# Patient Record
Sex: MALE | Race: WHITE | Employment: OTHER | URBAN - METROPOLITAN AREA
[De-identification: names, ages, dates, MRNs, and addresses within clinical notes are randomized per-mention and may not be internally consistent; named-entity substitution may affect disease eponyms.]

---

## 2017-11-02 PROBLEM — I72.4 FEMORAL ARTERY PSEUDO-ANEURYSM, LEFT (HCC): Status: ACTIVE | Noted: 2017-11-02

## 2018-01-01 ENCOUNTER — OFFICE VISIT (OUTPATIENT)
Dept: CARDIOLOGY CLINIC | Age: 78
End: 2018-01-01
Payer: MEDICARE

## 2018-01-01 ENCOUNTER — HOSPITAL ENCOUNTER (OUTPATIENT)
Dept: ULTRASOUND IMAGING | Age: 78
Discharge: HOME | End: 2018-12-07
Payer: MEDICARE

## 2018-01-01 ENCOUNTER — TELEPHONE (OUTPATIENT)
Dept: VASCULAR SURGERY | Age: 78
End: 2018-01-01

## 2018-01-01 ENCOUNTER — HOSPITAL ENCOUNTER (OUTPATIENT)
Dept: INTERVENTIONAL RADIOLOGY/VASCULAR | Age: 78
Discharge: HOME | End: 2018-12-07
Payer: MEDICARE

## 2018-01-01 ENCOUNTER — OFFICE VISIT (OUTPATIENT)
Dept: VASCULAR SURGERY | Age: 78
End: 2018-01-01
Payer: MEDICARE

## 2018-01-01 ENCOUNTER — HOSPITAL ENCOUNTER (OUTPATIENT)
Age: 78
End: 2018-01-01
Payer: MEDICARE

## 2018-01-01 VITALS
HEIGHT: 67 IN | DIASTOLIC BLOOD PRESSURE: 52 MMHG | RESPIRATION RATE: 18 BRPM | HEART RATE: 55 BPM | WEIGHT: 195 LBS | SYSTOLIC BLOOD PRESSURE: 98 MMHG | BODY MASS INDEX: 30.61 KG/M2

## 2018-01-01 DIAGNOSIS — I73.9 PVD (PERIPHERAL VASCULAR DISEASE) WITH CLAUDICATION (HCC): ICD-10-CM

## 2018-01-01 DIAGNOSIS — F17.200 TOBACCO DEPENDENCE: ICD-10-CM

## 2018-01-01 DIAGNOSIS — I10 ESSENTIAL HYPERTENSION: ICD-10-CM

## 2018-01-01 DIAGNOSIS — Z98.890 S/P CAROTID ENDARTERECTOMY: ICD-10-CM

## 2018-01-01 DIAGNOSIS — I89.0 LYMPHEDEMA OF BOTH LOWER EXTREMITIES: ICD-10-CM

## 2018-01-01 DIAGNOSIS — R09.89 BRUIT OF RIGHT CAROTID ARTERY: ICD-10-CM

## 2018-01-01 DIAGNOSIS — I25.10 ATHEROSCLEROSIS OF NATIVE CORONARY ARTERY OF NATIVE HEART WITHOUT ANGINA PECTORIS: Primary | ICD-10-CM

## 2018-01-01 DIAGNOSIS — I72.4 FEMORAL ARTERY ANEURYSM, LEFT (HCC): ICD-10-CM

## 2018-01-01 DIAGNOSIS — I72.4 FEMORAL ARTERY ANEURYSM, LEFT (HCC): Primary | ICD-10-CM

## 2018-01-01 DIAGNOSIS — I72.4 FEMORAL ARTERY PSEUDO-ANEURYSM, LEFT (HCC): ICD-10-CM

## 2018-01-01 DIAGNOSIS — Z98.62 HISTORY OF ANGIOPLASTY OF PERIPHERAL VESSEL: ICD-10-CM

## 2018-01-01 DIAGNOSIS — Z95.5 PRESENCE OF DRUG COATED STENT IN LEFT CIRCUMFLEX CORONARY ARTERY: ICD-10-CM

## 2018-01-01 PROCEDURE — 1123F ACP DISCUSS/DSCN MKR DOCD: CPT | Performed by: INTERNAL MEDICINE

## 2018-01-01 PROCEDURE — 4004F PT TOBACCO SCREEN RCVD TLK: CPT | Performed by: INTERNAL MEDICINE

## 2018-01-01 PROCEDURE — G8427 DOCREV CUR MEDS BY ELIG CLIN: HCPCS | Performed by: SURGERY

## 2018-01-01 PROCEDURE — 93880 EXTRACRANIAL BILAT STUDY: CPT

## 2018-01-01 PROCEDURE — 4040F PNEUMOC VAC/ADMIN/RCVD: CPT | Performed by: INTERNAL MEDICINE

## 2018-01-01 PROCEDURE — 1101F PT FALLS ASSESS-DOCD LE1/YR: CPT | Performed by: INTERNAL MEDICINE

## 2018-01-01 PROCEDURE — G8598 ASA/ANTIPLAT THER USED: HCPCS | Performed by: INTERNAL MEDICINE

## 2018-01-01 PROCEDURE — 4004F PT TOBACCO SCREEN RCVD TLK: CPT | Performed by: SURGERY

## 2018-01-01 PROCEDURE — 93923 UPR/LXTR ART STDY 3+ LVLS: CPT

## 2018-01-01 PROCEDURE — G8427 DOCREV CUR MEDS BY ELIG CLIN: HCPCS | Performed by: INTERNAL MEDICINE

## 2018-01-01 PROCEDURE — G8417 CALC BMI ABV UP PARAM F/U: HCPCS | Performed by: INTERNAL MEDICINE

## 2018-01-01 PROCEDURE — 1123F ACP DISCUSS/DSCN MKR DOCD: CPT | Performed by: SURGERY

## 2018-01-01 PROCEDURE — G8484 FLU IMMUNIZE NO ADMIN: HCPCS | Performed by: SURGERY

## 2018-01-01 PROCEDURE — 93000 ELECTROCARDIOGRAM COMPLETE: CPT | Performed by: INTERNAL MEDICINE

## 2018-01-01 PROCEDURE — G8421 BMI NOT CALCULATED: HCPCS | Performed by: SURGERY

## 2018-01-01 PROCEDURE — G8598 ASA/ANTIPLAT THER USED: HCPCS | Performed by: SURGERY

## 2018-01-01 PROCEDURE — 4040F PNEUMOC VAC/ADMIN/RCVD: CPT | Performed by: SURGERY

## 2018-01-01 PROCEDURE — G8484 FLU IMMUNIZE NO ADMIN: HCPCS | Performed by: INTERNAL MEDICINE

## 2018-01-01 PROCEDURE — 1101F PT FALLS ASSESS-DOCD LE1/YR: CPT | Performed by: SURGERY

## 2018-01-01 PROCEDURE — 99214 OFFICE O/P EST MOD 30 MIN: CPT | Performed by: SURGERY

## 2018-01-01 PROCEDURE — 93926 LOWER EXTREMITY STUDY: CPT

## 2018-01-01 PROCEDURE — 99213 OFFICE O/P EST LOW 20 MIN: CPT | Performed by: INTERNAL MEDICINE

## 2018-11-15 NOTE — PROGRESS NOTES
Chief Complaint:   Chief Complaint   Patient presents with    Circulatory Problem     Follow up circulation in legs.          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 left femoral-popliteal bypass overall doing well, still has some swelling of both due to lymphedema, complains of some cardiac issues following up with his medical doctors      Patient denies any focal lateralizing neurological symptoms like loss of speech, vision or loss of function of extremity    Patient can walk 1 block at a time, and denies any symptoms of rest pain    No Known Allergies    Current Outpatient Prescriptions   Medication Sig Dispense Refill    cilostazol (PLETAL) 100 MG tablet TAKE 1 TABLET BY MOUTH TWICE DAILY (Patient taking differently: Taking 50 mg twice daily) 60 tablet 11    baclofen (LIORESAL) 10 MG tablet 2 times daily as needed   0    mupirocin (BACTROBAN) 2 % cream Apply topically daily Buttock      Neomycin-Polymyxin-HC (CORTISPORIN OT) Place 2 drops in ear(s) 2 times daily 1 % both ears      cholestyramine (QUESTRAN) 4 GM/DOSE powder 4 g nightly 1 scoop  0    escitalopram (LEXAPRO) 20 MG tablet daily Take morning of surgery with a sip of water  1    HYDROcodone-acetaminophen (NORCO) 7.5-325 MG per tablet take 1 tablet by mouth every 6 hours if needed  0    butenafine (LOTRIMIN ULTRA) 1 % CREA Apply to both groins BID x 1 month 1 Tube 10    clopidogrel (PLAVIX) 75 MG tablet take 1 tablet by mouth once daily 30 tablet 5    loperamide (IMODIUM) 2 MG capsule Take 2 mg by mouth 4 times daily as needed for Diarrhea      atorvastatin (LIPITOR) 80 MG tablet Take 1 tablet by mouth nightly (Patient taking differently: Take 80 mg by mouth daily ) 30 tablet 3    diphenhydrAMINE (BENADRYL) 25 MG capsule Take 50 mg by mouth nightly as needed for Sleep       kaleigh    CAROTID ENDARTERECTOMY Right 11-2-15    Dr. Thomas Sis Left 10/31/2016    Dr. Lazaro Swift lense implants    FEMORAL BYPASS  2007    LAMINECTOMY  2004    OTHER SURGICAL HISTORY  1/8/16    Athrectomy/angioplasty R SFA/Popliteal    SKIN CANCER EXCISION  2011    lip-Dr Garcia Ser TONSILLECTOMY         Family History   Problem Relation Age of Onset    Heart Disease Father        Social History     Social History    Marital status:      Spouse name: N/A    Number of children: N/A    Years of education: N/A     Occupational History    Not on file. Social History Main Topics    Smoking status: Current Every Day Smoker     Packs/day: 0.50     Years: 30.00     Types: Cigars    Smokeless tobacco: Never Used      Comment: 1 cigar a day    Alcohol use No      Comment: rare-occasional    Drug use: No    Sexual activity: Not on file     Other Topics Concern    Not on file     Social History Narrative    No narrative on file       Review of Systems:    Eyes:  No blurring, diplopia or vision loss  Respiratory:  No cough, pleuritic chest pain, dyspnea, or wheezing  Cardiovascular: No angina, palpitations   Musculoskeletal:  No arthritis or weakness  Neurologic:  No paralysis, paresis, paresthesia, seizures or headach        Physical Exam:  General appearance:  Alert, awake, oriented x 3. No distress. Eyes:  Conjunctivae appear normal; PERRL  Neck:  No jugular venous distention, lymphadenopathy or thyromegaly. Carotid bruit noted  Lungs:  Clear to ausculation bilaterally. No rhonchi, crackles, wheezes  Heart:  Regular rate and rhythm. No rub or murmur  Abdomen:  Soft, non-tender. No masses, organomegaly. Musculoskeletal : No joint effusions, tenderness swelling    Neuro: Speech is intact. Moving all extremities. No focal motor or sensory deficits      Extremities:  Both feet are warm to touch.  The

## 2018-12-13 NOTE — PATIENT INSTRUCTIONS
especially good for you. Examples include spinach, carrots, peaches, and berries.     · Foods high in fiber can reduce your cholesterol and provide important vitamins and minerals. High-fiber foods include whole-grain cereals and breads, oatmeal, beans, brown rice, citrus fruits, and apples.     · Limit drinks and foods with added sugar. These include candy, desserts, and soda pop.    Lifestyle changes    · If your doctor recommends it, get more exercise. Walking is a good choice. Bit by bit, increase the amount you walk every day. Try for at least 30 minutes on most days of the week. You also may want to swim, bike, or do other activities.     · Do not smoke. If you need help quitting, talk to your doctor about stop-smoking programs and medicines. These can increase your chances of quitting for good. Quitting smoking may be the most important step you can take to protect your heart. It is never too late to quit. You will get health benefits right away.     · Limit alcohol to 2 drinks a day for men and 1 drink a day for women. Too much alcohol can cause health problems. Medicines    · Take your medicines exactly as prescribed. Call your doctor if you think you are having a problem with your medicine.     · If your doctor recommends aspirin, take the amount directed each day. Make sure you take aspirin and not another kind of pain reliever, such as acetaminophen (Tylenol). If you take ibuprofen (such as Advil or Motrin) for other problems, take aspirin at least 2 hours before taking ibuprofen. When should you call for help? Call 911 if you have symptoms of a heart attack.  These may include:    · Chest pain or pressure, or a strange feeling in the chest.     · Sweating.     · Shortness of breath.     · Pain, pressure, or a strange feeling in the back, neck, jaw, or upper belly or in one or both shoulders or arms.     · Lightheadedness or sudden weakness.     · A fast or irregular heartbeat.    After you call 911, the  may tell you to chew 1 adult-strength or 2 to 4 low-dose aspirin. Wait for an ambulance. Do not try to drive yourself.   Watch closely for changes in your health, and be sure to contact your doctor if you have any problems. Where can you learn more? Go to https://chpepiceweb.[employer]. org and sign in to your [employer] account. Enter H965 in the [employer] box to learn more about \"A Healthy Heart: Care Instructions. \"     If you do not have an account, please click on the \"Sign Up Now\" link. Current as of: December 6, 2017  Content Version: 11.8  © 1297-0296 Healthwise, Incorporated. Care instructions adapted under license by [facility] ([facility]). If you have questions about a medical condition or this instruction, always ask your healthcare professional. [address] any warranty or liability for your use of this information.

## 2019-01-01 ENCOUNTER — HOSPITAL ENCOUNTER (INPATIENT)
Age: 79
LOS: 5 days | Discharge: HOSPICE | DRG: 543 | End: 2019-09-11
Attending: EMERGENCY MEDICINE | Admitting: INTERNAL MEDICINE
Payer: MEDICARE

## 2019-01-01 ENCOUNTER — TELEPHONE (OUTPATIENT)
Dept: OTHER | Facility: CLINIC | Age: 79
End: 2019-01-01

## 2019-01-01 ENCOUNTER — HOSPITAL ENCOUNTER (OUTPATIENT)
Dept: RADIATION ONCOLOGY | Age: 79
Discharge: HOME | End: 2019-09-09
Attending: RADIOLOGY
Payer: MEDICARE

## 2019-01-01 ENCOUNTER — HOSPITAL ENCOUNTER (OUTPATIENT)
Age: 79
Discharge: HOME | End: 2019-09-09
Payer: MEDICARE

## 2019-01-01 ENCOUNTER — APPOINTMENT (OUTPATIENT)
Dept: CT IMAGING | Age: 79
DRG: 543 | End: 2019-01-01
Payer: MEDICARE

## 2019-01-01 ENCOUNTER — APPOINTMENT (OUTPATIENT)
Dept: GENERAL RADIOLOGY | Age: 79
DRG: 180 | End: 2019-01-01
Payer: MEDICARE

## 2019-01-01 ENCOUNTER — HOSPITAL ENCOUNTER (OUTPATIENT)
Dept: MRI IMAGING | Age: 79
Discharge: HOME | End: 2019-07-26
Payer: MEDICARE

## 2019-01-01 ENCOUNTER — HOSPITAL ENCOUNTER (INPATIENT)
Age: 79
LOS: 5 days | Discharge: NURSING, REHAB OR LONG-TERM CARE | DRG: 180 | End: 2019-08-09
Attending: EMERGENCY MEDICINE | Admitting: INTERNAL MEDICINE
Payer: MEDICARE

## 2019-01-01 ENCOUNTER — APPOINTMENT (OUTPATIENT)
Dept: CT IMAGING | Age: 79
DRG: 180 | End: 2019-01-01
Payer: MEDICARE

## 2019-01-01 ENCOUNTER — APPOINTMENT (OUTPATIENT)
Dept: ULTRASOUND IMAGING | Age: 79
DRG: 180 | End: 2019-01-01
Payer: MEDICARE

## 2019-01-01 ENCOUNTER — APPOINTMENT (OUTPATIENT)
Dept: MRI IMAGING | Age: 79
DRG: 543 | End: 2019-01-01
Payer: MEDICARE

## 2019-01-01 ENCOUNTER — OFFICE VISIT (OUTPATIENT)
Dept: CARDIOLOGY CLINIC | Age: 79
End: 2019-01-01
Payer: MEDICARE

## 2019-01-01 VITALS
HEART RATE: 59 BPM | BODY MASS INDEX: 25.71 KG/M2 | RESPIRATION RATE: 11 BRPM | OXYGEN SATURATION: 98 % | TEMPERATURE: 98.1 F | HEIGHT: 66 IN | SYSTOLIC BLOOD PRESSURE: 151 MMHG | WEIGHT: 160 LBS | DIASTOLIC BLOOD PRESSURE: 71 MMHG

## 2019-01-01 VITALS
HEART RATE: 62 BPM | SYSTOLIC BLOOD PRESSURE: 90 MMHG | DIASTOLIC BLOOD PRESSURE: 60 MMHG | OXYGEN SATURATION: 92 % | BODY MASS INDEX: 25.57 KG/M2 | HEIGHT: 67 IN | RESPIRATION RATE: 16 BRPM | WEIGHT: 162.9 LBS | TEMPERATURE: 97.1 F

## 2019-01-01 VITALS
HEIGHT: 67 IN | WEIGHT: 162.8 LBS | HEART RATE: 64 BPM | BODY MASS INDEX: 25.55 KG/M2 | DIASTOLIC BLOOD PRESSURE: 52 MMHG | OXYGEN SATURATION: 96 % | SYSTOLIC BLOOD PRESSURE: 102 MMHG | RESPIRATION RATE: 18 BRPM

## 2019-01-01 DIAGNOSIS — M54.16 LUMBAR RADICULOPATHY: ICD-10-CM

## 2019-01-01 DIAGNOSIS — R04.2 HEMOPTYSIS: ICD-10-CM

## 2019-01-01 DIAGNOSIS — I50.9 CONGESTIVE HEART FAILURE, UNSPECIFIED HF CHRONICITY, UNSPECIFIED HEART FAILURE TYPE (HCC): ICD-10-CM

## 2019-01-01 DIAGNOSIS — I50.22 CHRONIC SYSTOLIC CONGESTIVE HEART FAILURE (HCC): ICD-10-CM

## 2019-01-01 DIAGNOSIS — I25.700 CORONARY ARTERY DISEASE INVOLVING CORONARY BYPASS GRAFT WITH UNSTABLE ANGINA PECTORIS, UNSPECIFIED WHETHER NATIVE OR TRANSPLANTED HEART (HCC): ICD-10-CM

## 2019-01-01 DIAGNOSIS — M84.48XA PATHOLOGICAL FRACTURE OF CERVICAL VERTEBRA, INITIAL ENCOUNTER: Primary | ICD-10-CM

## 2019-01-01 DIAGNOSIS — R60.9 EDEMA, UNSPECIFIED TYPE: ICD-10-CM

## 2019-01-01 DIAGNOSIS — M84.48XA: ICD-10-CM

## 2019-01-01 DIAGNOSIS — I25.5 ISCHEMIC CARDIOMYOPATHY: ICD-10-CM

## 2019-01-01 DIAGNOSIS — I38 VALVULAR HEART DISEASE: ICD-10-CM

## 2019-01-01 DIAGNOSIS — J44.9 CHRONIC OBSTRUCTIVE PULMONARY DISEASE, UNSPECIFIED COPD TYPE (HCC): ICD-10-CM

## 2019-01-01 DIAGNOSIS — C34.00 MALIGNANT NEOPLASM OF HILUS OF LUNG, UNSPECIFIED LATERALITY (HCC): ICD-10-CM

## 2019-01-01 DIAGNOSIS — R07.9 CHEST PAIN, UNSPECIFIED TYPE: ICD-10-CM

## 2019-01-01 DIAGNOSIS — J18.9 PNEUMONIA DUE TO ORGANISM: Primary | ICD-10-CM

## 2019-01-01 DIAGNOSIS — I10 ESSENTIAL HYPERTENSION: Primary | ICD-10-CM

## 2019-01-01 LAB
AFB SMEAR: NORMAL
ALBUMIN FLUID: 2.2 G/DL
ALBUMIN SERPL-MCNC: 2.8 G/DL (ref 3.5–5.2)
ALP BLD-CCNC: 387 U/L (ref 40–129)
ALT SERPL-CCNC: 29 U/L (ref 0–40)
ANION GAP SERPL CALCULATED.3IONS-SCNC: 10 MMOL/L (ref 7–16)
ANION GAP SERPL CALCULATED.3IONS-SCNC: 6 MMOL/L (ref 7–16)
ANION GAP SERPL CALCULATED.3IONS-SCNC: 6 MMOL/L (ref 7–16)
ANION GAP SERPL CALCULATED.3IONS-SCNC: 7 MMOL/L (ref 7–16)
ANION GAP SERPL CALCULATED.3IONS-SCNC: 8 MMOL/L (ref 7–16)
APPEARANCE FLUID: NORMAL
APTT: 29.8 SEC (ref 24.5–35.1)
AST SERPL-CCNC: 29 U/L (ref 0–39)
BASOPHILS ABSOLUTE: 0.04 E9/L (ref 0–0.2)
BASOPHILS ABSOLUTE: 0.06 E9/L (ref 0–0.2)
BASOPHILS RELATIVE PERCENT: 0.3 % (ref 0–2)
BASOPHILS RELATIVE PERCENT: 0.7 % (ref 0–2)
BILIRUB SERPL-MCNC: 1.2 MG/DL (ref 0–1.2)
BLOOD CULTURE, ROUTINE: NORMAL
BODY FLUID CULTURE, STERILE: NORMAL
BUN BLDV-MCNC: 13 MG/DL (ref 8–23)
BUN BLDV-MCNC: 15 MG/DL (ref 8–23)
BUN BLDV-MCNC: 15 MG/DL (ref 8–23)
BUN BLDV-MCNC: 17 MG/DL (ref 8–23)
BUN BLDV-MCNC: 18 MG/DL (ref 8–23)
BUN BLDV-MCNC: 21 MG/DL (ref 8–23)
BUN BLDV-MCNC: 25 MG/DL (ref 8–23)
CALCIUM SERPL-MCNC: 8 MG/DL (ref 8.6–10.2)
CALCIUM SERPL-MCNC: 8.1 MG/DL (ref 8.6–10.2)
CALCIUM SERPL-MCNC: 8.3 MG/DL (ref 8.6–10.2)
CALCIUM SERPL-MCNC: 8.4 MG/DL (ref 8.6–10.2)
CALCIUM SERPL-MCNC: 8.6 MG/DL (ref 8.6–10.2)
CALCIUM SERPL-MCNC: 8.7 MG/DL (ref 8.6–10.2)
CALCIUM SERPL-MCNC: 8.7 MG/DL (ref 8.6–10.2)
CEA,FLUID: 4 NG/ML
CELL COUNT FLUID TYPE: NORMAL
CHLORIDE BLD-SCNC: 100 MMOL/L (ref 98–107)
CHLORIDE BLD-SCNC: 102 MMOL/L (ref 98–107)
CHLORIDE BLD-SCNC: 93 MMOL/L (ref 98–107)
CHLORIDE BLD-SCNC: 96 MMOL/L (ref 98–107)
CHLORIDE BLD-SCNC: 97 MMOL/L (ref 98–107)
CHLORIDE BLD-SCNC: 99 MMOL/L (ref 98–107)
CHLORIDE BLD-SCNC: 99 MMOL/L (ref 98–107)
CHOLESTEROL FLUID: 41 MG/DL
CO2: 32 MMOL/L (ref 22–29)
CO2: 32 MMOL/L (ref 22–29)
CO2: 33 MMOL/L (ref 22–29)
CO2: 34 MMOL/L (ref 22–29)
CO2: 41 MMOL/L (ref 22–29)
COLOR FLUID: YELLOW
CREAT SERPL-MCNC: 0.6 MG/DL (ref 0.7–1.2)
CREAT SERPL-MCNC: 0.6 MG/DL (ref 0.7–1.2)
CREAT SERPL-MCNC: 0.7 MG/DL (ref 0.7–1.2)
CREAT SERPL-MCNC: 0.7 MG/DL (ref 0.7–1.2)
CREAT SERPL-MCNC: 0.8 MG/DL (ref 0.7–1.2)
CREAT SERPL-MCNC: 0.9 MG/DL (ref 0.7–1.2)
CREAT SERPL-MCNC: 0.9 MG/DL (ref 0.7–1.2)
CREAT SERPL-MCNC: 1 MG/DL (ref 0.7–1.2)
CULTURE, BLOOD 2: NORMAL
EKG ATRIAL RATE: 56 BPM
EKG P AXIS: 25 DEGREES
EKG P-R INTERVAL: 250 MS
EKG Q-T INTERVAL: 470 MS
EKG QRS DURATION: 116 MS
EKG QTC CALCULATION (BAZETT): 453 MS
EKG R AXIS: -59 DEGREES
EKG T AXIS: -101 DEGREES
EKG VENTRICULAR RATE: 56 BPM
EOSINOPHILS ABSOLUTE: 0.64 E9/L (ref 0.05–0.5)
EOSINOPHILS ABSOLUTE: 0.85 E9/L (ref 0.05–0.5)
EOSINOPHILS RELATIVE PERCENT: 7.3 % (ref 0–6)
EOSINOPHILS RELATIVE PERCENT: 7.5 % (ref 0–6)
FILM ARRAY ADENOVIRUS: NORMAL
FILM ARRAY BORDETELLA PERTUSSIS: NORMAL
FILM ARRAY CHLAMYDOPHILIA PNEUMONIAE: NORMAL
FILM ARRAY CORONAVIRUS 229E: NORMAL
FILM ARRAY CORONAVIRUS HKU1: NORMAL
FILM ARRAY CORONAVIRUS NL63: NORMAL
FILM ARRAY CORONAVIRUS OC43: NORMAL
FILM ARRAY INFLUENZA A VIRUS 09H1: NORMAL
FILM ARRAY INFLUENZA A VIRUS H1: NORMAL
FILM ARRAY INFLUENZA A VIRUS H3: NORMAL
FILM ARRAY INFLUENZA A VIRUS: NORMAL
FILM ARRAY INFLUENZA B: NORMAL
FILM ARRAY METAPNEUMOVIRUS: NORMAL
FILM ARRAY MYCOPLASMA PNEUMONIAE: NORMAL
FILM ARRAY PARAINFLUENZA VIRUS 1: NORMAL
FILM ARRAY PARAINFLUENZA VIRUS 2: NORMAL
FILM ARRAY PARAINFLUENZA VIRUS 3: NORMAL
FILM ARRAY PARAINFLUENZA VIRUS 4: NORMAL
FILM ARRAY RESPIRATORY SYNCITIAL VIRUS: NORMAL
FILM ARRAY RHINOVIRUS/ENTEROVIRUS: NORMAL
FLUID TYPE: NORMAL
GFR AFRICAN AMERICAN: >60
GFR NON-AFRICAN AMERICAN: >60 ML/MIN/1.73
GLUCOSE BLD-MCNC: 112 MG/DL (ref 74–99)
GLUCOSE BLD-MCNC: 123 MG/DL (ref 74–99)
GLUCOSE BLD-MCNC: 127 MG/DL (ref 74–99)
GLUCOSE BLD-MCNC: 155 MG/DL (ref 74–99)
GLUCOSE BLD-MCNC: 161 MG/DL (ref 74–99)
GLUCOSE BLD-MCNC: 96 MG/DL (ref 74–99)
GLUCOSE BLD-MCNC: 96 MG/DL (ref 74–99)
GLUCOSE, FLUID: 104 MG/DL
GRAM STAIN ORDERABLE: NORMAL
GRAM STAIN RESULT: NORMAL
HCT VFR BLD CALC: 29.9 % (ref 37–54)
HCT VFR BLD CALC: 31.4 % (ref 37–54)
HCT VFR BLD CALC: 32.8 % (ref 37–54)
HCT VFR BLD CALC: 39 % (ref 37–54)
HCT VFR BLD CALC: 44.1 % (ref 37–54)
HEMOGLOBIN: 10.3 G/DL (ref 12.5–16.5)
HEMOGLOBIN: 12.5 G/DL (ref 12.5–16.5)
HEMOGLOBIN: 14.1 G/DL (ref 12.5–16.5)
HEMOGLOBIN: 9.5 G/DL (ref 12.5–16.5)
HEMOGLOBIN: 9.9 G/DL (ref 12.5–16.5)
IMMATURE GRANULOCYTES #: 0.02 E9/L
IMMATURE GRANULOCYTES #: 0.1 E9/L
IMMATURE GRANULOCYTES %: 0.2 % (ref 0–5)
IMMATURE GRANULOCYTES %: 0.9 % (ref 0–5)
INR BLD: 1.3
LACTIC ACID: 1 MMOL/L (ref 0.5–2.2)
LACTIC ACID: 1 MMOL/L (ref 0.5–2.2)
LD, FLUID: 113 U/L
LV EF: 45 %
LVEF MODALITY: NORMAL
LYMPHOCYTES ABSOLUTE: 0.76 E9/L (ref 1.5–4)
LYMPHOCYTES ABSOLUTE: 0.85 E9/L (ref 1.5–4)
LYMPHOCYTES RELATIVE PERCENT: 10 % (ref 20–42)
LYMPHOCYTES RELATIVE PERCENT: 6.5 % (ref 20–42)
MAGNESIUM: 1.9 MG/DL (ref 1.6–2.6)
MAGNESIUM: 2 MG/DL (ref 1.6–2.6)
MCH RBC QN AUTO: 32.6 PG (ref 26–35)
MCH RBC QN AUTO: 32.6 PG (ref 26–35)
MCH RBC QN AUTO: 32.8 PG (ref 26–35)
MCH RBC QN AUTO: 33.3 PG (ref 26–35)
MCH RBC QN AUTO: 33.5 PG (ref 26–35)
MCHC RBC AUTO-ENTMCNC: 31.4 % (ref 32–34.5)
MCHC RBC AUTO-ENTMCNC: 31.5 % (ref 32–34.5)
MCHC RBC AUTO-ENTMCNC: 31.8 % (ref 32–34.5)
MCHC RBC AUTO-ENTMCNC: 32 % (ref 32–34.5)
MCHC RBC AUTO-ENTMCNC: 32.1 % (ref 32–34.5)
MCV RBC AUTO: 102.6 FL (ref 80–99.9)
MCV RBC AUTO: 103.3 FL (ref 80–99.9)
MCV RBC AUTO: 103.8 FL (ref 80–99.9)
MCV RBC AUTO: 104 FL (ref 80–99.9)
MCV RBC AUTO: 105.3 FL (ref 80–99.9)
MONOCYTE, FLUID: 90 %
MONOCYTES ABSOLUTE: 0.34 E9/L (ref 0.1–0.95)
MONOCYTES ABSOLUTE: 0.65 E9/L (ref 0.1–0.95)
MONOCYTES RELATIVE PERCENT: 4 % (ref 2–12)
MONOCYTES RELATIVE PERCENT: 5.6 % (ref 2–12)
NEUTROPHIL, FLUID: 10 %
NEUTROPHILS ABSOLUTE: 6.61 E9/L (ref 1.8–7.3)
NEUTROPHILS ABSOLUTE: 9.3 E9/L (ref 1.8–7.3)
NEUTROPHILS RELATIVE PERCENT: 77.6 % (ref 43–80)
NEUTROPHILS RELATIVE PERCENT: 79.4 % (ref 43–80)
NUCLEATED CELLS FLUID: 1472 /UL
PDW BLD-RTO: 13.7 FL (ref 11.5–15)
PDW BLD-RTO: 14.2 FL (ref 11.5–15)
PDW BLD-RTO: 14.3 FL (ref 11.5–15)
PDW BLD-RTO: 14.4 FL (ref 11.5–15)
PDW BLD-RTO: 14.5 FL (ref 11.5–15)
PLATELET # BLD: 115 E9/L (ref 130–450)
PLATELET # BLD: 135 E9/L (ref 130–450)
PLATELET # BLD: 158 E9/L (ref 130–450)
PLATELET # BLD: 168 E9/L (ref 130–450)
PLATELET # BLD: 178 E9/L (ref 130–450)
PMV BLD AUTO: 11.2 FL (ref 7–12)
PMV BLD AUTO: 11.6 FL (ref 7–12)
PMV BLD AUTO: 11.7 FL (ref 7–12)
PMV BLD AUTO: 11.8 FL (ref 7–12)
PMV BLD AUTO: 12.3 FL (ref 7–12)
POTASSIUM REFLEX MAGNESIUM: 3.7 MMOL/L (ref 3.5–5)
POTASSIUM REFLEX MAGNESIUM: 3.9 MMOL/L (ref 3.5–5)
POTASSIUM SERPL-SCNC: 3.8 MMOL/L (ref 3.5–5)
POTASSIUM SERPL-SCNC: 4 MMOL/L (ref 3.5–5)
POTASSIUM SERPL-SCNC: 4.2 MMOL/L (ref 3.5–5)
POTASSIUM SERPL-SCNC: 4.8 MMOL/L (ref 3.5–5)
POTASSIUM SERPL-SCNC: 4.8 MMOL/L (ref 3.5–5)
PRO-BNP: 2547 PG/ML (ref 0–450)
PROCALCITONIN: 0.06 NG/ML (ref 0–0.08)
PROTEIN FLUID: 4.6 G/DL
PROTHROMBIN TIME: 14.2 SEC (ref 9.3–12.4)
RBC # BLD: 2.84 E12/L (ref 3.8–5.8)
RBC # BLD: 3.04 E12/L (ref 3.8–5.8)
RBC # BLD: 3.16 E12/L (ref 3.8–5.8)
RBC # BLD: 3.75 E12/L (ref 3.8–5.8)
RBC # BLD: 4.3 E12/L (ref 3.8–5.8)
RBC FLUID: 6000 /UL
SODIUM BLD-SCNC: 136 MMOL/L (ref 132–146)
SODIUM BLD-SCNC: 139 MMOL/L (ref 132–146)
SODIUM BLD-SCNC: 140 MMOL/L (ref 132–146)
SODIUM BLD-SCNC: 142 MMOL/L (ref 132–146)
T4 FREE: 1.07 NG/DL (ref 0.93–1.7)
TOTAL PROTEIN: 7.1 G/DL (ref 6.4–8.3)
TROPONIN: <0.01 NG/ML (ref 0–0.03)
TROPONIN: <0.01 NG/ML (ref 0–0.03)
TSH SERPL DL<=0.05 MIU/L-ACNC: 3.89 UIU/ML (ref 0.27–4.2)
WBC # BLD: 11.7 E9/L (ref 4.5–11.5)
WBC # BLD: 15.8 E9/L (ref 4.5–11.5)
WBC # BLD: 16.6 E9/L (ref 4.5–11.5)
WBC # BLD: 8.4 E9/L (ref 4.5–11.5)
WBC # BLD: 8.5 E9/L (ref 4.5–11.5)

## 2019-01-01 PROCEDURE — 6370000000 HC RX 637 (ALT 250 FOR IP): Performed by: INTERNAL MEDICINE

## 2019-01-01 PROCEDURE — 87205 SMEAR GRAM STAIN: CPT

## 2019-01-01 PROCEDURE — 2500000003 HC RX 250 WO HCPCS: Performed by: FAMILY MEDICINE

## 2019-01-01 PROCEDURE — APPSS60 APP SPLIT SHARED TIME 46-60 MINUTES: Performed by: NURSE PRACTITIONER

## 2019-01-01 PROCEDURE — 2700000000 HC OXYGEN THERAPY PER DAY

## 2019-01-01 PROCEDURE — 6370000000 HC RX 637 (ALT 250 FOR IP): Performed by: FAMILY MEDICINE

## 2019-01-01 PROCEDURE — 2580000003 HC RX 258: Performed by: INTERNAL MEDICINE

## 2019-01-01 PROCEDURE — 2580000003 HC RX 258: Performed by: FAMILY MEDICINE

## 2019-01-01 PROCEDURE — 92610 EVALUATE SWALLOWING FUNCTION: CPT | Performed by: SPEECH-LANGUAGE PATHOLOGIST

## 2019-01-01 PROCEDURE — 92526 ORAL FUNCTION THERAPY: CPT | Performed by: SPEECH-LANGUAGE PATHOLOGIST

## 2019-01-01 PROCEDURE — 84443 ASSAY THYROID STIM HORMONE: CPT

## 2019-01-01 PROCEDURE — 88112 CYTOPATH CELL ENHANCE TECH: CPT

## 2019-01-01 PROCEDURE — 1200000000 HC SEMI PRIVATE

## 2019-01-01 PROCEDURE — 72156 MRI NECK SPINE W/O & W/DYE: CPT

## 2019-01-01 PROCEDURE — 94640 AIRWAY INHALATION TREATMENT: CPT

## 2019-01-01 PROCEDURE — 99285 EMERGENCY DEPT VISIT HI MDM: CPT

## 2019-01-01 PROCEDURE — 6360000004 HC RX CONTRAST MEDICATION: Performed by: RADIOLOGY

## 2019-01-01 PROCEDURE — 6360000002 HC RX W HCPCS: Performed by: INTERNAL MEDICINE

## 2019-01-01 PROCEDURE — A0425 GROUND MILEAGE: HCPCS

## 2019-01-01 PROCEDURE — 97530 THERAPEUTIC ACTIVITIES: CPT

## 2019-01-01 PROCEDURE — 80048 BASIC METABOLIC PNL TOTAL CA: CPT

## 2019-01-01 PROCEDURE — 6360000002 HC RX W HCPCS: Performed by: RADIOLOGY

## 2019-01-01 PROCEDURE — 85027 COMPLETE CBC AUTOMATED: CPT

## 2019-01-01 PROCEDURE — G8417 CALC BMI ABV UP PARAM F/U: HCPCS | Performed by: NURSE PRACTITIONER

## 2019-01-01 PROCEDURE — 99222 1ST HOSP IP/OBS MODERATE 55: CPT | Performed by: NEUROLOGICAL SURGERY

## 2019-01-01 PROCEDURE — 71045 X-RAY EXAM CHEST 1 VIEW: CPT

## 2019-01-01 PROCEDURE — 87040 BLOOD CULTURE FOR BACTERIA: CPT

## 2019-01-01 PROCEDURE — 2060000000 HC ICU INTERMEDIATE R&B

## 2019-01-01 PROCEDURE — 97165 OT EVAL LOW COMPLEX 30 MIN: CPT

## 2019-01-01 PROCEDURE — 36415 COLL VENOUS BLD VENIPUNCTURE: CPT

## 2019-01-01 PROCEDURE — 84484 ASSAY OF TROPONIN QUANT: CPT

## 2019-01-01 PROCEDURE — 32405 CT NEEDLE BIOPSY LUNG PERCUTANEOUS: CPT

## 2019-01-01 PROCEDURE — 6370000000 HC RX 637 (ALT 250 FOR IP): Performed by: NURSE PRACTITIONER

## 2019-01-01 PROCEDURE — 99232 SBSQ HOSP IP/OBS MODERATE 35: CPT | Performed by: NURSE PRACTITIONER

## 2019-01-01 PROCEDURE — 99232 SBSQ HOSP IP/OBS MODERATE 35: CPT | Performed by: INTERNAL MEDICINE

## 2019-01-01 PROCEDURE — 6360000002 HC RX W HCPCS: Performed by: FAMILY MEDICINE

## 2019-01-01 PROCEDURE — 88305 TISSUE EXAM BY PATHOLOGIST: CPT

## 2019-01-01 PROCEDURE — 99213 OFFICE O/P EST LOW 20 MIN: CPT | Performed by: NURSE PRACTITIONER

## 2019-01-01 PROCEDURE — 88341 IMHCHEM/IMCYTCHM EA ADD ANTB: CPT

## 2019-01-01 PROCEDURE — 93005 ELECTROCARDIOGRAM TRACING: CPT | Performed by: EMERGENCY MEDICINE

## 2019-01-01 PROCEDURE — 77417 THER RADIOLOGY PORT IMAGE(S): CPT | Performed by: RADIOLOGY

## 2019-01-01 PROCEDURE — 99223 1ST HOSP IP/OBS HIGH 75: CPT | Performed by: INTERNAL MEDICINE

## 2019-01-01 PROCEDURE — 83735 ASSAY OF MAGNESIUM: CPT

## 2019-01-01 PROCEDURE — 96374 THER/PROPH/DIAG INJ IV PUSH: CPT

## 2019-01-01 PROCEDURE — 87798 DETECT AGENT NOS DNA AMP: CPT

## 2019-01-01 PROCEDURE — 94664 DEMO&/EVAL PT USE INHALER: CPT

## 2019-01-01 PROCEDURE — 84439 ASSAY OF FREE THYROXINE: CPT

## 2019-01-01 PROCEDURE — 0W9B3ZZ DRAINAGE OF LEFT PLEURAL CAVITY, PERCUTANEOUS APPROACH: ICD-10-PCS | Performed by: INTERNAL MEDICINE

## 2019-01-01 PROCEDURE — 0W993ZZ DRAINAGE OF RIGHT PLEURAL CAVITY, PERCUTANEOUS APPROACH: ICD-10-PCS | Performed by: RADIOLOGY

## 2019-01-01 PROCEDURE — 6370000000 HC RX 637 (ALT 250 FOR IP): Performed by: RADIOLOGY

## 2019-01-01 PROCEDURE — 82378 CARCINOEMBRYONIC ANTIGEN: CPT

## 2019-01-01 PROCEDURE — 88342 IMHCHEM/IMCYTCHM 1ST ANTB: CPT

## 2019-01-01 PROCEDURE — 93000 ELECTROCARDIOGRAM COMPLETE: CPT | Performed by: INTERNAL MEDICINE

## 2019-01-01 PROCEDURE — 80053 COMPREHEN METABOLIC PANEL: CPT

## 2019-01-01 PROCEDURE — 71250 CT THORAX DX C-: CPT

## 2019-01-01 PROCEDURE — 93306 TTE W/DOPPLER COMPLETE: CPT

## 2019-01-01 PROCEDURE — 87486 CHLMYD PNEUM DNA AMP PROBE: CPT

## 2019-01-01 PROCEDURE — 2580000003 HC RX 258: Performed by: EMERGENCY MEDICINE

## 2019-01-01 PROCEDURE — 0BBC3ZX EXCISION OF RIGHT UPPER LUNG LOBE, PERCUTANEOUS APPROACH, DIAGNOSTIC: ICD-10-PCS | Performed by: RADIOLOGY

## 2019-01-01 PROCEDURE — 85025 COMPLETE CBC W/AUTO DIFF WBC: CPT

## 2019-01-01 PROCEDURE — 84145 PROCALCITONIN (PCT): CPT

## 2019-01-01 PROCEDURE — 83605 ASSAY OF LACTIC ACID: CPT

## 2019-01-01 PROCEDURE — 77412 RADIATION TX DELIVERY LVL 3: CPT | Performed by: RADIOLOGY

## 2019-01-01 PROCEDURE — 84999 UNLISTED CHEMISTRY PROCEDURE: CPT

## 2019-01-01 PROCEDURE — 77307 TELETHX ISODOSE PLAN CPLX: CPT | Performed by: RADIOLOGY

## 2019-01-01 PROCEDURE — 87633 RESP VIRUS 12-25 TARGETS: CPT

## 2019-01-01 PROCEDURE — 85610 PROTHROMBIN TIME: CPT

## 2019-01-01 PROCEDURE — APPSS45 APP SPLIT SHARED TIME 31-45 MINUTES: Performed by: NURSE PRACTITIONER

## 2019-01-01 PROCEDURE — 72125 CT NECK SPINE W/O DYE: CPT

## 2019-01-01 PROCEDURE — 93010 ELECTROCARDIOGRAM REPORT: CPT | Performed by: INTERNAL MEDICINE

## 2019-01-01 PROCEDURE — 87070 CULTURE OTHR SPECIMN AEROBIC: CPT

## 2019-01-01 PROCEDURE — 6360000002 HC RX W HCPCS: Performed by: NURSE PRACTITIONER

## 2019-01-01 PROCEDURE — 85730 THROMBOPLASTIN TIME PARTIAL: CPT

## 2019-01-01 PROCEDURE — 99223 1ST HOSP IP/OBS HIGH 75: CPT | Performed by: RADIOLOGY

## 2019-01-01 PROCEDURE — A9577 INJ MULTIHANCE: HCPCS | Performed by: RADIOLOGY

## 2019-01-01 PROCEDURE — 83615 LACTATE (LD) (LDH) ENZYME: CPT

## 2019-01-01 PROCEDURE — 83880 ASSAY OF NATRIURETIC PEPTIDE: CPT

## 2019-01-01 PROCEDURE — 77012 CT SCAN FOR NEEDLE BIOPSY: CPT

## 2019-01-01 PROCEDURE — 6360000002 HC RX W HCPCS: Performed by: EMERGENCY MEDICINE

## 2019-01-01 PROCEDURE — 77334 RADIATION TREATMENT AID(S): CPT | Performed by: RADIOLOGY

## 2019-01-01 PROCEDURE — 82947 ASSAY GLUCOSE BLOOD QUANT: CPT

## 2019-01-01 PROCEDURE — 4040F PNEUMOC VAC/ADMIN/RCVD: CPT | Performed by: NURSE PRACTITIONER

## 2019-01-01 PROCEDURE — 97161 PT EVAL LOW COMPLEX 20 MIN: CPT

## 2019-01-01 PROCEDURE — 87581 M.PNEUMON DNA AMP PROBE: CPT

## 2019-01-01 PROCEDURE — G8427 DOCREV CUR MEDS BY ELIG CLIN: HCPCS | Performed by: NURSE PRACTITIONER

## 2019-01-01 PROCEDURE — 4004F PT TOBACCO SCREEN RCVD TLK: CPT | Performed by: NURSE PRACTITIONER

## 2019-01-01 PROCEDURE — APPSS30 APP SPLIT SHARED TIME 16-30 MINUTES: Performed by: NURSE PRACTITIONER

## 2019-01-01 PROCEDURE — 1123F ACP DISCUSS/DSCN MKR DOCD: CPT | Performed by: NURSE PRACTITIONER

## 2019-01-01 PROCEDURE — 32555 ASPIRATE PLEURA W/ IMAGING: CPT

## 2019-01-01 PROCEDURE — 96376 TX/PRO/DX INJ SAME DRUG ADON: CPT

## 2019-01-01 PROCEDURE — 72148 MRI LUMBAR SPINE W/O DYE: CPT

## 2019-01-01 PROCEDURE — 84157 ASSAY OF PROTEIN OTHER: CPT

## 2019-01-01 PROCEDURE — 71275 CT ANGIOGRAPHY CHEST: CPT

## 2019-01-01 PROCEDURE — 97535 SELF CARE MNGMENT TRAINING: CPT

## 2019-01-01 PROCEDURE — 82042 OTHER SOURCE ALBUMIN QUAN EA: CPT

## 2019-01-01 PROCEDURE — 87206 SMEAR FLUORESCENT/ACID STAI: CPT

## 2019-01-01 PROCEDURE — 2500000003 HC RX 250 WO HCPCS: Performed by: EMERGENCY MEDICINE

## 2019-01-01 PROCEDURE — 82565 ASSAY OF CREATININE: CPT

## 2019-01-01 PROCEDURE — A0428 BLS: HCPCS

## 2019-01-01 PROCEDURE — G8598 ASA/ANTIPLAT THER USED: HCPCS | Performed by: NURSE PRACTITIONER

## 2019-01-01 PROCEDURE — 1111F DSCHRG MED/CURRENT MED MERGE: CPT | Performed by: NURSE PRACTITIONER

## 2019-01-01 PROCEDURE — 89051 BODY FLUID CELL COUNT: CPT

## 2019-01-01 RX ORDER — OXYCODONE HCL 10 MG/1
10 TABLET, FILM COATED, EXTENDED RELEASE ORAL EVERY 8 HOURS
Status: DISCONTINUED | OUTPATIENT
Start: 2019-01-01 | End: 2019-01-01 | Stop reason: HOSPADM

## 2019-01-01 RX ORDER — IPRATROPIUM BROMIDE AND ALBUTEROL SULFATE 2.5; .5 MG/3ML; MG/3ML
1 SOLUTION RESPIRATORY (INHALATION)
Status: DISCONTINUED | OUTPATIENT
Start: 2019-01-01 | End: 2019-01-01 | Stop reason: HOSPADM

## 2019-01-01 RX ORDER — SODIUM CHLORIDE 0.9 % (FLUSH) 0.9 %
10 SYRINGE (ML) INJECTION EVERY 12 HOURS SCHEDULED
Status: DISCONTINUED | OUTPATIENT
Start: 2019-01-01 | End: 2019-01-01 | Stop reason: HOSPADM

## 2019-01-01 RX ORDER — MAGNESIUM SULFATE IN WATER 40 MG/ML
2 INJECTION, SOLUTION INTRAVENOUS ONCE
Status: COMPLETED | OUTPATIENT
Start: 2019-01-01 | End: 2019-01-01

## 2019-01-01 RX ORDER — DIPHENHYDRAMINE HCL 25 MG
50 TABLET ORAL NIGHTLY
Status: DISCONTINUED | OUTPATIENT
Start: 2019-01-01 | End: 2019-01-01 | Stop reason: HOSPADM

## 2019-01-01 RX ORDER — BACLOFEN 10 MG/1
10 TABLET ORAL 2 TIMES DAILY
Status: DISCONTINUED | OUTPATIENT
Start: 2019-01-01 | End: 2019-01-01 | Stop reason: HOSPADM

## 2019-01-01 RX ORDER — SODIUM CHLORIDE 0.9 % (FLUSH) 0.9 %
10 SYRINGE (ML) INJECTION PRN
Status: DISCONTINUED | OUTPATIENT
Start: 2019-01-01 | End: 2019-01-01 | Stop reason: HOSPADM

## 2019-01-01 RX ORDER — POTASSIUM CHLORIDE 20 MEQ/1
40 TABLET, EXTENDED RELEASE ORAL ONCE
Status: COMPLETED | OUTPATIENT
Start: 2019-01-01 | End: 2019-01-01

## 2019-01-01 RX ORDER — POLYETHYLENE GLYCOL 3350 17 G/17G
17 POWDER, FOR SOLUTION ORAL DAILY
Status: DISCONTINUED | OUTPATIENT
Start: 2019-01-01 | End: 2019-01-01 | Stop reason: HOSPADM

## 2019-01-01 RX ORDER — OXYCODONE HCL 10 MG/1
10 TABLET, FILM COATED, EXTENDED RELEASE ORAL EVERY 12 HOURS SCHEDULED
Status: DISCONTINUED | OUTPATIENT
Start: 2019-01-01 | End: 2019-01-01

## 2019-01-01 RX ORDER — LEVOTHYROXINE SODIUM 0.05 MG/1
50 TABLET ORAL DAILY
Status: DISCONTINUED | OUTPATIENT
Start: 2019-01-01 | End: 2019-01-01 | Stop reason: HOSPADM

## 2019-01-01 RX ORDER — ASPIRIN 325 MG
162.5 TABLET ORAL DAILY
Status: DISCONTINUED | OUTPATIENT
Start: 2019-01-01 | End: 2019-01-01

## 2019-01-01 RX ORDER — LISINOPRIL 10 MG/1
10 TABLET ORAL DAILY
Status: DISCONTINUED | OUTPATIENT
Start: 2019-01-01 | End: 2019-01-01 | Stop reason: HOSPADM

## 2019-01-01 RX ORDER — DEXAMETHASONE SODIUM PHOSPHATE 4 MG/ML
4 INJECTION, SOLUTION INTRA-ARTICULAR; INTRALESIONAL; INTRAMUSCULAR; INTRAVENOUS; SOFT TISSUE EVERY 6 HOURS
Status: DISCONTINUED | OUTPATIENT
Start: 2019-01-01 | End: 2019-01-01

## 2019-01-01 RX ORDER — ACETAMINOPHEN 325 MG/1
650 TABLET ORAL EVERY 4 HOURS PRN
Status: DISCONTINUED | OUTPATIENT
Start: 2019-01-01 | End: 2019-01-01 | Stop reason: HOSPADM

## 2019-01-01 RX ORDER — MAGNESIUM SULFATE IN WATER 40 MG/ML
INJECTION, SOLUTION INTRAVENOUS
Status: DISCONTINUED
Start: 2019-01-01 | End: 2019-01-01

## 2019-01-01 RX ORDER — GABAPENTIN 300 MG/1
600 CAPSULE ORAL 3 TIMES DAILY
Status: DISCONTINUED | OUTPATIENT
Start: 2019-01-01 | End: 2019-01-01 | Stop reason: HOSPADM

## 2019-01-01 RX ORDER — LOPERAMIDE HYDROCHLORIDE 2 MG/1
2 CAPSULE ORAL 4 TIMES DAILY PRN
Status: DISCONTINUED | OUTPATIENT
Start: 2019-01-01 | End: 2019-01-01 | Stop reason: HOSPADM

## 2019-01-01 RX ORDER — CLOPIDOGREL BISULFATE 75 MG/1
75 TABLET ORAL DAILY
Status: DISCONTINUED | OUTPATIENT
Start: 2019-01-01 | End: 2019-01-01 | Stop reason: HOSPADM

## 2019-01-01 RX ORDER — ONDANSETRON 2 MG/ML
4 INJECTION INTRAMUSCULAR; INTRAVENOUS EVERY 6 HOURS PRN
Status: DISCONTINUED | OUTPATIENT
Start: 2019-01-01 | End: 2019-01-01 | Stop reason: HOSPADM

## 2019-01-01 RX ORDER — OXYCODONE HYDROCHLORIDE AND ACETAMINOPHEN 5; 325 MG/1; MG/1
1 TABLET ORAL EVERY 4 HOURS PRN
Status: DISCONTINUED | OUTPATIENT
Start: 2019-01-01 | End: 2019-01-01 | Stop reason: HOSPADM

## 2019-01-01 RX ORDER — IPRATROPIUM BROMIDE AND ALBUTEROL SULFATE 2.5; .5 MG/3ML; MG/3ML
3 SOLUTION RESPIRATORY (INHALATION)
Status: DISCONTINUED | OUTPATIENT
Start: 2019-01-01 | End: 2019-01-01 | Stop reason: SDUPTHER

## 2019-01-01 RX ORDER — FUROSEMIDE 10 MG/ML
40 INJECTION INTRAMUSCULAR; INTRAVENOUS DAILY
Status: DISCONTINUED | OUTPATIENT
Start: 2019-01-01 | End: 2019-01-01

## 2019-01-01 RX ORDER — DIPHENHYDRAMINE HCL 25 MG
50 TABLET ORAL NIGHTLY PRN
Status: DISCONTINUED | OUTPATIENT
Start: 2019-01-01 | End: 2019-01-01 | Stop reason: HOSPADM

## 2019-01-01 RX ORDER — UBIDECARENONE 75 MG
500 CAPSULE ORAL DAILY
Status: DISCONTINUED | OUTPATIENT
Start: 2019-01-01 | End: 2019-01-01 | Stop reason: HOSPADM

## 2019-01-01 RX ORDER — SODIUM CHLORIDE, SODIUM LACTATE, POTASSIUM CHLORIDE, CALCIUM CHLORIDE 600; 310; 30; 20 MG/100ML; MG/100ML; MG/100ML; MG/100ML
INJECTION, SOLUTION INTRAVENOUS CONTINUOUS
Status: DISCONTINUED | OUTPATIENT
Start: 2019-01-01 | End: 2019-01-01

## 2019-01-01 RX ORDER — HYDROCODONE BITARTRATE AND ACETAMINOPHEN 7.5; 325 MG/1; MG/1
1 TABLET ORAL EVERY 6 HOURS PRN
Status: DISCONTINUED | OUTPATIENT
Start: 2019-01-01 | End: 2019-01-01

## 2019-01-01 RX ORDER — OXYCODONE HCL 10 MG/1
10 TABLET, FILM COATED, EXTENDED RELEASE ORAL EVERY 8 HOURS
Qty: 60 EACH | Refills: 0 | Status: SHIPPED | DISCHARGE
Start: 2019-01-01 | End: 2019-10-11

## 2019-01-01 RX ORDER — CHOLESTYRAMINE 4 G/9G
4 POWDER, FOR SUSPENSION ORAL 2 TIMES DAILY
Status: DISCONTINUED | OUTPATIENT
Start: 2019-01-01 | End: 2019-01-01 | Stop reason: HOSPADM

## 2019-01-01 RX ORDER — CARVEDILOL 6.25 MG/1
12.5 TABLET ORAL 2 TIMES DAILY
Status: DISCONTINUED | OUTPATIENT
Start: 2019-01-01 | End: 2019-01-01 | Stop reason: HOSPADM

## 2019-01-01 RX ORDER — GABAPENTIN 600 MG/1
600 TABLET ORAL 3 TIMES DAILY
Status: DISCONTINUED | OUTPATIENT
Start: 2019-01-01 | End: 2019-01-01

## 2019-01-01 RX ORDER — HYDROCODONE BITARTRATE AND ACETAMINOPHEN 5; 325 MG/1; MG/1
1 TABLET ORAL EVERY 4 HOURS
Status: ON HOLD | COMMUNITY
End: 2019-01-01 | Stop reason: HOSPADM

## 2019-01-01 RX ORDER — ALPRAZOLAM 0.25 MG/1
0.5 TABLET ORAL 3 TIMES DAILY PRN
Status: DISCONTINUED | OUTPATIENT
Start: 2019-01-01 | End: 2019-01-01 | Stop reason: HOSPADM

## 2019-01-01 RX ORDER — FUROSEMIDE 40 MG/1
40 TABLET ORAL DAILY
Status: DISCONTINUED | OUTPATIENT
Start: 2019-01-01 | End: 2019-01-01

## 2019-01-01 RX ORDER — 0.9 % SODIUM CHLORIDE 0.9 %
500 INTRAVENOUS SOLUTION INTRAVENOUS ONCE
Status: COMPLETED | OUTPATIENT
Start: 2019-01-01 | End: 2019-01-01

## 2019-01-01 RX ORDER — BUMETANIDE 0.5 MG/1
0.5 TABLET ORAL DAILY
Qty: 30 TABLET | Refills: 3 | Status: ON HOLD | DISCHARGE
Start: 2019-01-01 | End: 2019-01-01 | Stop reason: HOSPADM

## 2019-01-01 RX ORDER — DEXAMETHASONE SODIUM PHOSPHATE 4 MG/ML
4 INJECTION, SOLUTION INTRA-ARTICULAR; INTRALESIONAL; INTRAMUSCULAR; INTRAVENOUS; SOFT TISSUE EVERY 8 HOURS
Status: DISCONTINUED | OUTPATIENT
Start: 2019-01-01 | End: 2019-01-01 | Stop reason: HOSPADM

## 2019-01-01 RX ORDER — CILOSTAZOL 50 MG/1
25 TABLET ORAL 2 TIMES DAILY
Status: DISCONTINUED | OUTPATIENT
Start: 2019-01-01 | End: 2019-01-01 | Stop reason: HOSPADM

## 2019-01-01 RX ORDER — IPRATROPIUM BROMIDE AND ALBUTEROL SULFATE 2.5; .5 MG/3ML; MG/3ML
3 SOLUTION RESPIRATORY (INHALATION)
Qty: 360 ML | DISCHARGE
Start: 2019-01-01

## 2019-01-01 RX ORDER — DIPHENHYDRAMINE HCL 50 MG
50 CAPSULE ORAL NIGHTLY
Status: DISCONTINUED | OUTPATIENT
Start: 2019-01-01 | End: 2019-01-01

## 2019-01-01 RX ORDER — ALPRAZOLAM 0.5 MG/1
0.5 TABLET ORAL 3 TIMES DAILY PRN
Status: SHIPPED | DISCHARGE
Start: 2019-01-01 | End: 2019-10-11

## 2019-01-01 RX ORDER — PANTOPRAZOLE SODIUM 40 MG/1
40 TABLET, DELAYED RELEASE ORAL
Status: DISCONTINUED | OUTPATIENT
Start: 2019-01-01 | End: 2019-01-01 | Stop reason: HOSPADM

## 2019-01-01 RX ORDER — ATORVASTATIN CALCIUM 40 MG/1
80 TABLET, FILM COATED ORAL NIGHTLY
Status: DISCONTINUED | OUTPATIENT
Start: 2019-01-01 | End: 2019-01-01 | Stop reason: HOSPADM

## 2019-01-01 RX ORDER — ESCITALOPRAM OXALATE 10 MG/1
20 TABLET ORAL DAILY
Status: DISCONTINUED | OUTPATIENT
Start: 2019-01-01 | End: 2019-01-01 | Stop reason: HOSPADM

## 2019-01-01 RX ORDER — ESCITALOPRAM OXALATE 10 MG/1
10 TABLET ORAL DAILY
Status: DISCONTINUED | OUTPATIENT
Start: 2019-01-01 | End: 2019-01-01 | Stop reason: HOSPADM

## 2019-01-01 RX ORDER — ACETAMINOPHEN 325 MG/1
650 TABLET ORAL EVERY 6 HOURS PRN
COMMUNITY
End: 2019-01-01 | Stop reason: ALTCHOICE

## 2019-01-01 RX ORDER — OXYCODONE HYDROCHLORIDE AND ACETAMINOPHEN 5; 325 MG/1; MG/1
1 TABLET ORAL EVERY 4 HOURS PRN
Refills: 0 | Status: SHIPPED | DISCHARGE
Start: 2019-01-01 | End: 2019-01-01

## 2019-01-01 RX ORDER — BUMETANIDE 1 MG/1
0.5 TABLET ORAL DAILY
Status: DISCONTINUED | OUTPATIENT
Start: 2019-01-01 | End: 2019-01-01 | Stop reason: HOSPADM

## 2019-01-01 RX ADMIN — BACLOFEN 10 MG: 10 TABLET ORAL at 08:22

## 2019-01-01 RX ADMIN — PIPERACILLIN SODIUM AND TAZOBACTAM SODIUM 3.38 G: 3; .375 INJECTION, POWDER, LYOPHILIZED, FOR SOLUTION INTRAVENOUS at 03:21

## 2019-01-01 RX ADMIN — PIPERACILLIN SODIUM AND TAZOBACTAM SODIUM 3.38 G: 3; .375 INJECTION, POWDER, LYOPHILIZED, FOR SOLUTION INTRAVENOUS at 02:19

## 2019-01-01 RX ADMIN — OXYCODONE HYDROCHLORIDE AND ACETAMINOPHEN 1 TABLET: 5; 325 TABLET ORAL at 06:27

## 2019-01-01 RX ADMIN — Medication 10 ML: at 20:12

## 2019-01-01 RX ADMIN — CARVEDILOL 12.5 MG: 6.25 TABLET, FILM COATED ORAL at 08:38

## 2019-01-01 RX ADMIN — IPRATROPIUM BROMIDE AND ALBUTEROL SULFATE 1 AMPULE: .5; 3 SOLUTION RESPIRATORY (INHALATION) at 07:24

## 2019-01-01 RX ADMIN — DOXYCYCLINE 100 MG: 100 INJECTION, POWDER, LYOPHILIZED, FOR SOLUTION INTRAVENOUS at 04:58

## 2019-01-01 RX ADMIN — OXYCODONE HYDROCHLORIDE 10 MG: 10 TABLET, FILM COATED, EXTENDED RELEASE ORAL at 09:46

## 2019-01-01 RX ADMIN — HYDROMORPHONE HYDROCHLORIDE 1 MG: 1 INJECTION, SOLUTION INTRAMUSCULAR; INTRAVENOUS; SUBCUTANEOUS at 15:26

## 2019-01-01 RX ADMIN — Medication 500 MCG: at 09:46

## 2019-01-01 RX ADMIN — FUROSEMIDE 40 MG: 40 TABLET ORAL at 06:25

## 2019-01-01 RX ADMIN — CARVEDILOL 12.5 MG: 6.25 TABLET, FILM COATED ORAL at 09:08

## 2019-01-01 RX ADMIN — Medication 500 MCG: at 09:28

## 2019-01-01 RX ADMIN — GABAPENTIN 600 MG: 300 CAPSULE ORAL at 21:08

## 2019-01-01 RX ADMIN — HYDROMORPHONE HYDROCHLORIDE 0.5 MG: 1 INJECTION, SOLUTION INTRAMUSCULAR; INTRAVENOUS; SUBCUTANEOUS at 01:08

## 2019-01-01 RX ADMIN — PANTOPRAZOLE SODIUM 40 MG: 40 TABLET, DELAYED RELEASE ORAL at 05:16

## 2019-01-01 RX ADMIN — BACLOFEN 10 MG: 10 TABLET ORAL at 08:04

## 2019-01-01 RX ADMIN — OXYCODONE HYDROCHLORIDE AND ACETAMINOPHEN 1 TABLET: 5; 325 TABLET ORAL at 10:24

## 2019-01-01 RX ADMIN — PIPERACILLIN SODIUM AND TAZOBACTAM SODIUM 3.38 G: 3; .375 INJECTION, POWDER, LYOPHILIZED, FOR SOLUTION INTRAVENOUS at 11:36

## 2019-01-01 RX ADMIN — PIPERACILLIN SODIUM AND TAZOBACTAM SODIUM 3.38 G: 3; .375 INJECTION, POWDER, LYOPHILIZED, FOR SOLUTION INTRAVENOUS at 03:19

## 2019-01-01 RX ADMIN — IPRATROPIUM BROMIDE AND ALBUTEROL SULFATE 1 AMPULE: .5; 3 SOLUTION RESPIRATORY (INHALATION) at 20:50

## 2019-01-01 RX ADMIN — BACLOFEN 10 MG: 10 TABLET ORAL at 20:18

## 2019-01-01 RX ADMIN — DIPHENHYDRAMINE HCL 50 MG: 25 TABLET ORAL at 19:50

## 2019-01-01 RX ADMIN — GABAPENTIN 600 MG: 300 CAPSULE ORAL at 08:04

## 2019-01-01 RX ADMIN — OXYCODONE HYDROCHLORIDE AND ACETAMINOPHEN 1 TABLET: 5; 325 TABLET ORAL at 03:02

## 2019-01-01 RX ADMIN — LEVOTHYROXINE SODIUM 50 MCG: 50 TABLET ORAL at 06:28

## 2019-01-01 RX ADMIN — DEXAMETHASONE SODIUM PHOSPHATE 4 MG: 4 INJECTION, SOLUTION INTRAMUSCULAR; INTRAVENOUS at 16:58

## 2019-01-01 RX ADMIN — ENOXAPARIN SODIUM 40 MG: 40 INJECTION SUBCUTANEOUS at 21:09

## 2019-01-01 RX ADMIN — BACLOFEN 10 MG: 10 TABLET ORAL at 20:10

## 2019-01-01 RX ADMIN — OXYCODONE HYDROCHLORIDE AND ACETAMINOPHEN 1 TABLET: 5; 325 TABLET ORAL at 22:39

## 2019-01-01 RX ADMIN — CARVEDILOL 12.5 MG: 6.25 TABLET, FILM COATED ORAL at 21:07

## 2019-01-01 RX ADMIN — MAGNESIUM SULFATE HEPTAHYDRATE 2 G: 40 INJECTION, SOLUTION INTRAVENOUS at 15:40

## 2019-01-01 RX ADMIN — IPRATROPIUM BROMIDE AND ALBUTEROL SULFATE 1 AMPULE: .5; 3 SOLUTION RESPIRATORY (INHALATION) at 20:17

## 2019-01-01 RX ADMIN — LEVOTHYROXINE SODIUM 50 MCG: 50 TABLET ORAL at 05:36

## 2019-01-01 RX ADMIN — DIPHENHYDRAMINE HCL 50 MG: 25 TABLET ORAL at 20:30

## 2019-01-01 RX ADMIN — CHOLESTYRAMINE 4 G: 4 POWDER, FOR SUSPENSION ORAL at 21:08

## 2019-01-01 RX ADMIN — LEVOTHYROXINE SODIUM 50 MCG: 50 TABLET ORAL at 06:22

## 2019-01-01 RX ADMIN — DOXYCYCLINE 100 MG: 100 INJECTION, POWDER, LYOPHILIZED, FOR SOLUTION INTRAVENOUS at 16:40

## 2019-01-01 RX ADMIN — HYDROMORPHONE HYDROCHLORIDE 0.5 MG: 1 INJECTION, SOLUTION INTRAMUSCULAR; INTRAVENOUS; SUBCUTANEOUS at 21:22

## 2019-01-01 RX ADMIN — OXYCODONE HYDROCHLORIDE AND ACETAMINOPHEN 1 TABLET: 5; 325 TABLET ORAL at 08:45

## 2019-01-01 RX ADMIN — BACLOFEN 10 MG: 10 TABLET ORAL at 09:46

## 2019-01-01 RX ADMIN — DEXAMETHASONE SODIUM PHOSPHATE 4 MG: 4 INJECTION, SOLUTION INTRAMUSCULAR; INTRAVENOUS at 23:32

## 2019-01-01 RX ADMIN — BACLOFEN 10 MG: 10 TABLET ORAL at 09:09

## 2019-01-01 RX ADMIN — BACLOFEN 10 MG: 10 TABLET ORAL at 08:20

## 2019-01-01 RX ADMIN — LEVOTHYROXINE SODIUM 50 MCG: 50 TABLET ORAL at 05:51

## 2019-01-01 RX ADMIN — HYDROMORPHONE HYDROCHLORIDE 1 MG: 1 INJECTION, SOLUTION INTRAMUSCULAR; INTRAVENOUS; SUBCUTANEOUS at 16:28

## 2019-01-01 RX ADMIN — PIPERACILLIN SODIUM AND TAZOBACTAM SODIUM 3.38 G: 3; .375 INJECTION, POWDER, LYOPHILIZED, FOR SOLUTION INTRAVENOUS at 19:05

## 2019-01-01 RX ADMIN — Medication 10 ML: at 20:31

## 2019-01-01 RX ADMIN — ESCITALOPRAM OXALATE 10 MG: 10 TABLET ORAL at 08:15

## 2019-01-01 RX ADMIN — Medication 10 ML: at 23:35

## 2019-01-01 RX ADMIN — GABAPENTIN 600 MG: 300 CAPSULE ORAL at 14:12

## 2019-01-01 RX ADMIN — LEVOTHYROXINE SODIUM 50 MCG: 50 TABLET ORAL at 06:25

## 2019-01-01 RX ADMIN — GABAPENTIN 600 MG: 300 CAPSULE ORAL at 08:39

## 2019-01-01 RX ADMIN — Medication 10 ML: at 08:04

## 2019-01-01 RX ADMIN — DEXAMETHASONE SODIUM PHOSPHATE 4 MG: 4 INJECTION, SOLUTION INTRAMUSCULAR; INTRAVENOUS at 11:29

## 2019-01-01 RX ADMIN — HYDROMORPHONE HYDROCHLORIDE 0.5 MG: 1 INJECTION, SOLUTION INTRAMUSCULAR; INTRAVENOUS; SUBCUTANEOUS at 19:57

## 2019-01-01 RX ADMIN — SODIUM CHLORIDE, POTASSIUM CHLORIDE, SODIUM LACTATE AND CALCIUM CHLORIDE: 600; 310; 30; 20 INJECTION, SOLUTION INTRAVENOUS at 05:16

## 2019-01-01 RX ADMIN — ATORVASTATIN CALCIUM 80 MG: 40 TABLET, FILM COATED ORAL at 21:08

## 2019-01-01 RX ADMIN — OXYCODONE HYDROCHLORIDE 10 MG: 10 TABLET, FILM COATED, EXTENDED RELEASE ORAL at 09:08

## 2019-01-01 RX ADMIN — CHOLESTYRAMINE 4 G: 4 POWDER, FOR SUSPENSION ORAL at 20:10

## 2019-01-01 RX ADMIN — DEXAMETHASONE SODIUM PHOSPHATE 4 MG: 4 INJECTION, SOLUTION INTRAMUSCULAR; INTRAVENOUS at 22:24

## 2019-01-01 RX ADMIN — DEXAMETHASONE SODIUM PHOSPHATE 4 MG: 4 INJECTION, SOLUTION INTRAMUSCULAR; INTRAVENOUS at 06:32

## 2019-01-01 RX ADMIN — PIPERACILLIN SODIUM AND TAZOBACTAM SODIUM 3.38 G: 3; .375 INJECTION, POWDER, LYOPHILIZED, FOR SOLUTION INTRAVENOUS at 11:17

## 2019-01-01 RX ADMIN — IPRATROPIUM BROMIDE AND ALBUTEROL SULFATE 1 AMPULE: .5; 3 SOLUTION RESPIRATORY (INHALATION) at 16:48

## 2019-01-01 RX ADMIN — OXYCODONE HYDROCHLORIDE AND ACETAMINOPHEN 1 TABLET: 5; 325 TABLET ORAL at 21:32

## 2019-01-01 RX ADMIN — DIPHENHYDRAMINE HCL 50 MG: 25 TABLET ORAL at 21:08

## 2019-01-01 RX ADMIN — OXYCODONE HYDROCHLORIDE AND ACETAMINOPHEN 1 TABLET: 5; 325 TABLET ORAL at 16:20

## 2019-01-01 RX ADMIN — IPRATROPIUM BROMIDE AND ALBUTEROL SULFATE 1 AMPULE: .5; 3 SOLUTION RESPIRATORY (INHALATION) at 07:31

## 2019-01-01 RX ADMIN — GABAPENTIN 600 MG: 300 CAPSULE ORAL at 21:09

## 2019-01-01 RX ADMIN — HYDROCODONE BITARTRATE AND ACETAMINOPHEN 1 TABLET: 7.5; 325 TABLET ORAL at 03:11

## 2019-01-01 RX ADMIN — ESCITALOPRAM OXALATE 20 MG: 10 TABLET ORAL at 09:09

## 2019-01-01 RX ADMIN — DOXYCYCLINE 100 MG: 100 INJECTION, POWDER, LYOPHILIZED, FOR SOLUTION INTRAVENOUS at 05:20

## 2019-01-01 RX ADMIN — OXYCODONE HYDROCHLORIDE AND ACETAMINOPHEN 1 TABLET: 5; 325 TABLET ORAL at 18:10

## 2019-01-01 RX ADMIN — CARVEDILOL 12.5 MG: 6.25 TABLET, FILM COATED ORAL at 21:01

## 2019-01-01 RX ADMIN — DEXAMETHASONE SODIUM PHOSPHATE 4 MG: 4 INJECTION, SOLUTION INTRAMUSCULAR; INTRAVENOUS at 22:47

## 2019-01-01 RX ADMIN — IPRATROPIUM BROMIDE AND ALBUTEROL SULFATE 1 AMPULE: .5; 3 SOLUTION RESPIRATORY (INHALATION) at 11:24

## 2019-01-01 RX ADMIN — IPRATROPIUM BROMIDE AND ALBUTEROL SULFATE 1 AMPULE: .5; 3 SOLUTION RESPIRATORY (INHALATION) at 20:37

## 2019-01-01 RX ADMIN — HYDROMORPHONE HYDROCHLORIDE 0.5 MG: 1 INJECTION, SOLUTION INTRAMUSCULAR; INTRAVENOUS; SUBCUTANEOUS at 15:56

## 2019-01-01 RX ADMIN — GABAPENTIN 600 MG: 300 CAPSULE ORAL at 08:14

## 2019-01-01 RX ADMIN — LISINOPRIL 10 MG: 10 TABLET ORAL at 11:06

## 2019-01-01 RX ADMIN — IPRATROPIUM BROMIDE AND ALBUTEROL SULFATE 1 AMPULE: .5; 3 SOLUTION RESPIRATORY (INHALATION) at 08:57

## 2019-01-01 RX ADMIN — ATORVASTATIN CALCIUM 80 MG: 40 TABLET, FILM COATED ORAL at 20:30

## 2019-01-01 RX ADMIN — HYDROCODONE BITARTRATE AND ACETAMINOPHEN 1 TABLET: 7.5; 325 TABLET ORAL at 18:32

## 2019-01-01 RX ADMIN — Medication 500 MCG: at 16:57

## 2019-01-01 RX ADMIN — ESCITALOPRAM OXALATE 10 MG: 10 TABLET ORAL at 08:22

## 2019-01-01 RX ADMIN — Medication 10 ML: at 09:47

## 2019-01-01 RX ADMIN — IPRATROPIUM BROMIDE AND ALBUTEROL SULFATE 1 AMPULE: .5; 3 SOLUTION RESPIRATORY (INHALATION) at 12:12

## 2019-01-01 RX ADMIN — HYDROMORPHONE HYDROCHLORIDE 0.5 MG: 1 INJECTION, SOLUTION INTRAMUSCULAR; INTRAVENOUS; SUBCUTANEOUS at 12:48

## 2019-01-01 RX ADMIN — BUMETANIDE 0.5 MG: 1 TABLET ORAL at 10:20

## 2019-01-01 RX ADMIN — HYDROCODONE BITARTRATE AND ACETAMINOPHEN 1 TABLET: 7.5; 325 TABLET ORAL at 18:47

## 2019-01-01 RX ADMIN — GABAPENTIN 600 MG: 300 CAPSULE ORAL at 20:52

## 2019-01-01 RX ADMIN — BACLOFEN 10 MG: 10 TABLET ORAL at 21:09

## 2019-01-01 RX ADMIN — IPRATROPIUM BROMIDE AND ALBUTEROL SULFATE 1 AMPULE: .5; 3 SOLUTION RESPIRATORY (INHALATION) at 21:27

## 2019-01-01 RX ADMIN — HYDROMORPHONE HYDROCHLORIDE 0.5 MG: 1 INJECTION, SOLUTION INTRAMUSCULAR; INTRAVENOUS; SUBCUTANEOUS at 06:37

## 2019-01-01 RX ADMIN — IPRATROPIUM BROMIDE AND ALBUTEROL SULFATE 1 AMPULE: .5; 3 SOLUTION RESPIRATORY (INHALATION) at 16:35

## 2019-01-01 RX ADMIN — GABAPENTIN 600 MG: 300 CAPSULE ORAL at 15:09

## 2019-01-01 RX ADMIN — DEXAMETHASONE SODIUM PHOSPHATE 4 MG: 4 INJECTION, SOLUTION INTRAMUSCULAR; INTRAVENOUS at 05:16

## 2019-01-01 RX ADMIN — CILOSTAZOL 25 MG: 50 TABLET ORAL at 08:20

## 2019-01-01 RX ADMIN — LISINOPRIL 10 MG: 10 TABLET ORAL at 08:14

## 2019-01-01 RX ADMIN — DEXAMETHASONE SODIUM PHOSPHATE 4 MG: 4 INJECTION, SOLUTION INTRAMUSCULAR; INTRAVENOUS at 06:23

## 2019-01-01 RX ADMIN — ATORVASTATIN CALCIUM 80 MG: 40 TABLET, FILM COATED ORAL at 20:11

## 2019-01-01 RX ADMIN — IPRATROPIUM BROMIDE AND ALBUTEROL SULFATE 1 AMPULE: .5; 3 SOLUTION RESPIRATORY (INHALATION) at 11:18

## 2019-01-01 RX ADMIN — OXYCODONE HYDROCHLORIDE 10 MG: 10 TABLET, FILM COATED, EXTENDED RELEASE ORAL at 18:23

## 2019-01-01 RX ADMIN — IPRATROPIUM BROMIDE AND ALBUTEROL SULFATE 1 AMPULE: .5; 3 SOLUTION RESPIRATORY (INHALATION) at 16:33

## 2019-01-01 RX ADMIN — LISINOPRIL 10 MG: 10 TABLET ORAL at 08:22

## 2019-01-01 RX ADMIN — DEXAMETHASONE SODIUM PHOSPHATE 4 MG: 4 INJECTION, SOLUTION INTRAMUSCULAR; INTRAVENOUS at 12:00

## 2019-01-01 RX ADMIN — PIPERACILLIN SODIUM AND TAZOBACTAM SODIUM 3.38 G: 3; .375 INJECTION, POWDER, LYOPHILIZED, FOR SOLUTION INTRAVENOUS at 18:28

## 2019-01-01 RX ADMIN — HYDROMORPHONE HYDROCHLORIDE 0.5 MG: 1 INJECTION, SOLUTION INTRAMUSCULAR; INTRAVENOUS; SUBCUTANEOUS at 01:29

## 2019-01-01 RX ADMIN — CHOLESTYRAMINE 4 G: 4 POWDER, FOR SUSPENSION ORAL at 08:15

## 2019-01-01 RX ADMIN — Medication 10 ML: at 09:00

## 2019-01-01 RX ADMIN — FUROSEMIDE 40 MG: 10 INJECTION, SOLUTION INTRAVENOUS at 06:26

## 2019-01-01 RX ADMIN — ASPIRIN 162.5 MG: 325 TABLET, COATED ORAL at 08:14

## 2019-01-01 RX ADMIN — PIPERACILLIN SODIUM AND TAZOBACTAM SODIUM 3.38 G: 3; .375 INJECTION, POWDER, LYOPHILIZED, FOR SOLUTION INTRAVENOUS at 11:09

## 2019-01-01 RX ADMIN — DIPHENHYDRAMINE HCL 50 MG: 25 TABLET ORAL at 20:10

## 2019-01-01 RX ADMIN — DEXAMETHASONE SODIUM PHOSPHATE 4 MG: 4 INJECTION, SOLUTION INTRAMUSCULAR; INTRAVENOUS at 15:06

## 2019-01-01 RX ADMIN — OXYCODONE HYDROCHLORIDE AND ACETAMINOPHEN 1 TABLET: 5; 325 TABLET ORAL at 00:24

## 2019-01-01 RX ADMIN — BACLOFEN 10 MG: 10 TABLET ORAL at 21:01

## 2019-01-01 RX ADMIN — IPRATROPIUM BROMIDE AND ALBUTEROL SULFATE 1 AMPULE: .5; 3 SOLUTION RESPIRATORY (INHALATION) at 11:20

## 2019-01-01 RX ADMIN — OXYCODONE HYDROCHLORIDE AND ACETAMINOPHEN 1 TABLET: 5; 325 TABLET ORAL at 20:10

## 2019-01-01 RX ADMIN — ATORVASTATIN CALCIUM 80 MG: 40 TABLET, FILM COATED ORAL at 21:09

## 2019-01-01 RX ADMIN — CILOSTAZOL 25 MG: 50 TABLET ORAL at 20:19

## 2019-01-01 RX ADMIN — OXYCODONE HYDROCHLORIDE AND ACETAMINOPHEN 1 TABLET: 5; 325 TABLET ORAL at 12:33

## 2019-01-01 RX ADMIN — IPRATROPIUM BROMIDE AND ALBUTEROL SULFATE 1 AMPULE: .5; 3 SOLUTION RESPIRATORY (INHALATION) at 09:21

## 2019-01-01 RX ADMIN — ESCITALOPRAM OXALATE 20 MG: 10 TABLET ORAL at 08:38

## 2019-01-01 RX ADMIN — Medication 10 ML: at 21:23

## 2019-01-01 RX ADMIN — IPRATROPIUM BROMIDE AND ALBUTEROL SULFATE 1 AMPULE: .5; 3 SOLUTION RESPIRATORY (INHALATION) at 21:36

## 2019-01-01 RX ADMIN — OXYCODONE HYDROCHLORIDE AND ACETAMINOPHEN 1 TABLET: 5; 325 TABLET ORAL at 09:29

## 2019-01-01 RX ADMIN — CLOPIDOGREL BISULFATE 75 MG: 75 TABLET ORAL at 08:22

## 2019-01-01 RX ADMIN — OXYCODONE HYDROCHLORIDE AND ACETAMINOPHEN 1 TABLET: 5; 325 TABLET ORAL at 08:04

## 2019-01-01 RX ADMIN — CARVEDILOL 12.5 MG: 6.25 TABLET, FILM COATED ORAL at 21:09

## 2019-01-01 RX ADMIN — ESCITALOPRAM OXALATE 20 MG: 10 TABLET ORAL at 09:28

## 2019-01-01 RX ADMIN — Medication 10 ML: at 08:24

## 2019-01-01 RX ADMIN — CLOPIDOGREL BISULFATE 75 MG: 75 TABLET ORAL at 08:15

## 2019-01-01 RX ADMIN — IPRATROPIUM BROMIDE AND ALBUTEROL SULFATE 1 AMPULE: .5; 3 SOLUTION RESPIRATORY (INHALATION) at 19:57

## 2019-01-01 RX ADMIN — OXYCODONE HYDROCHLORIDE AND ACETAMINOPHEN 1 TABLET: 5; 325 TABLET ORAL at 19:11

## 2019-01-01 RX ADMIN — DOXYCYCLINE 100 MG: 100 INJECTION, POWDER, LYOPHILIZED, FOR SOLUTION INTRAVENOUS at 05:39

## 2019-01-01 RX ADMIN — CARVEDILOL 12.5 MG: 6.25 TABLET, FILM COATED ORAL at 09:46

## 2019-01-01 RX ADMIN — IPRATROPIUM BROMIDE AND ALBUTEROL SULFATE 1 AMPULE: .5; 3 SOLUTION RESPIRATORY (INHALATION) at 07:22

## 2019-01-01 RX ADMIN — CARVEDILOL 12.5 MG: 6.25 TABLET, FILM COATED ORAL at 08:22

## 2019-01-01 RX ADMIN — CARVEDILOL 12.5 MG: 6.25 TABLET, FILM COATED ORAL at 21:08

## 2019-01-01 RX ADMIN — Medication 10 ML: at 05:20

## 2019-01-01 RX ADMIN — HYDROMORPHONE HYDROCHLORIDE 0.5 MG: 1 INJECTION, SOLUTION INTRAMUSCULAR; INTRAVENOUS; SUBCUTANEOUS at 11:29

## 2019-01-01 RX ADMIN — OXYCODONE HYDROCHLORIDE 10 MG: 10 TABLET, FILM COATED, EXTENDED RELEASE ORAL at 21:08

## 2019-01-01 RX ADMIN — IPRATROPIUM BROMIDE AND ALBUTEROL SULFATE 1 AMPULE: .5; 3 SOLUTION RESPIRATORY (INHALATION) at 21:20

## 2019-01-01 RX ADMIN — FUROSEMIDE 40 MG: 10 INJECTION, SOLUTION INTRAVENOUS at 05:51

## 2019-01-01 RX ADMIN — GADOBENATE DIMEGLUMINE 15 ML: 529 INJECTION, SOLUTION INTRAVENOUS at 14:58

## 2019-01-01 RX ADMIN — Medication 10 ML: at 20:32

## 2019-01-01 RX ADMIN — HYDROCODONE BITARTRATE AND ACETAMINOPHEN 1 TABLET: 7.5; 325 TABLET ORAL at 11:19

## 2019-01-01 RX ADMIN — PIPERACILLIN SODIUM AND TAZOBACTAM SODIUM 3.38 G: 3; .375 INJECTION, POWDER, LYOPHILIZED, FOR SOLUTION INTRAVENOUS at 18:41

## 2019-01-01 RX ADMIN — GABAPENTIN 600 MG: 300 CAPSULE ORAL at 21:01

## 2019-01-01 RX ADMIN — IPRATROPIUM BROMIDE AND ALBUTEROL SULFATE 1 AMPULE: .5; 3 SOLUTION RESPIRATORY (INHALATION) at 07:25

## 2019-01-01 RX ADMIN — IPRATROPIUM BROMIDE AND ALBUTEROL SULFATE 1 AMPULE: .5; 3 SOLUTION RESPIRATORY (INHALATION) at 13:35

## 2019-01-01 RX ADMIN — OXYCODONE HYDROCHLORIDE AND ACETAMINOPHEN 1 TABLET: 5; 325 TABLET ORAL at 21:10

## 2019-01-01 RX ADMIN — OXYCODONE HYDROCHLORIDE AND ACETAMINOPHEN 1 TABLET: 5; 325 TABLET ORAL at 05:51

## 2019-01-01 RX ADMIN — ATORVASTATIN CALCIUM 80 MG: 40 TABLET, FILM COATED ORAL at 20:10

## 2019-01-01 RX ADMIN — GABAPENTIN 600 MG: 300 CAPSULE ORAL at 13:19

## 2019-01-01 RX ADMIN — DEXAMETHASONE SODIUM PHOSPHATE 4 MG: 4 INJECTION, SOLUTION INTRAMUSCULAR; INTRAVENOUS at 17:45

## 2019-01-01 RX ADMIN — GABAPENTIN 600 MG: 300 CAPSULE ORAL at 08:22

## 2019-01-01 RX ADMIN — IPRATROPIUM BROMIDE AND ALBUTEROL SULFATE 1 AMPULE: .5; 3 SOLUTION RESPIRATORY (INHALATION) at 12:07

## 2019-01-01 RX ADMIN — Medication 10 ML: at 08:36

## 2019-01-01 RX ADMIN — ENOXAPARIN SODIUM 40 MG: 40 INJECTION SUBCUTANEOUS at 20:31

## 2019-01-01 RX ADMIN — OXYCODONE HYDROCHLORIDE AND ACETAMINOPHEN 1 TABLET: 5; 325 TABLET ORAL at 06:34

## 2019-01-01 RX ADMIN — DEXAMETHASONE SODIUM PHOSPHATE 4 MG: 4 INJECTION, SOLUTION INTRAMUSCULAR; INTRAVENOUS at 22:18

## 2019-01-01 RX ADMIN — CILOSTAZOL 25 MG: 50 TABLET ORAL at 21:09

## 2019-01-01 RX ADMIN — CARVEDILOL 12.5 MG: 6.25 TABLET, FILM COATED ORAL at 08:36

## 2019-01-01 RX ADMIN — PIPERACILLIN SODIUM AND TAZOBACTAM SODIUM 3.38 G: 3; .375 INJECTION, POWDER, LYOPHILIZED, FOR SOLUTION INTRAVENOUS at 18:46

## 2019-01-01 RX ADMIN — OXYCODONE HYDROCHLORIDE 10 MG: 10 TABLET, FILM COATED, EXTENDED RELEASE ORAL at 08:39

## 2019-01-01 RX ADMIN — ESCITALOPRAM OXALATE 10 MG: 10 TABLET ORAL at 08:36

## 2019-01-01 RX ADMIN — ACETAMINOPHEN 650 MG: 325 TABLET ORAL at 23:42

## 2019-01-01 RX ADMIN — CHOLESTYRAMINE 4 G: 4 POWDER, FOR SUSPENSION ORAL at 20:12

## 2019-01-01 RX ADMIN — SODIUM CHLORIDE, POTASSIUM CHLORIDE, SODIUM LACTATE AND CALCIUM CHLORIDE: 600; 310; 30; 20 INJECTION, SOLUTION INTRAVENOUS at 01:06

## 2019-01-01 RX ADMIN — ATORVASTATIN CALCIUM 80 MG: 40 TABLET, FILM COATED ORAL at 20:19

## 2019-01-01 RX ADMIN — DOXYCYCLINE 100 MG: 100 INJECTION, POWDER, LYOPHILIZED, FOR SOLUTION INTRAVENOUS at 05:09

## 2019-01-01 RX ADMIN — IPRATROPIUM BROMIDE AND ALBUTEROL SULFATE 1 AMPULE: .5; 3 SOLUTION RESPIRATORY (INHALATION) at 07:17

## 2019-01-01 RX ADMIN — ESCITALOPRAM OXALATE 10 MG: 10 TABLET ORAL at 08:21

## 2019-01-01 RX ADMIN — OXYCODONE HYDROCHLORIDE AND ACETAMINOPHEN 1 TABLET: 5; 325 TABLET ORAL at 16:39

## 2019-01-01 RX ADMIN — Medication 10 ML: at 08:38

## 2019-01-01 RX ADMIN — Medication 10 ML: at 21:09

## 2019-01-01 RX ADMIN — OXYCODONE HYDROCHLORIDE 10 MG: 10 TABLET, FILM COATED, EXTENDED RELEASE ORAL at 21:01

## 2019-01-01 RX ADMIN — BACLOFEN 10 MG: 10 TABLET ORAL at 21:07

## 2019-01-01 RX ADMIN — CHOLESTYRAMINE 4 G: 4 POWDER, FOR SUSPENSION ORAL at 20:27

## 2019-01-01 RX ADMIN — DEXAMETHASONE SODIUM PHOSPHATE 4 MG: 4 INJECTION, SOLUTION INTRAMUSCULAR; INTRAVENOUS at 16:54

## 2019-01-01 RX ADMIN — IPRATROPIUM BROMIDE AND ALBUTEROL SULFATE 1 AMPULE: .5; 3 SOLUTION RESPIRATORY (INHALATION) at 20:09

## 2019-01-01 RX ADMIN — CARVEDILOL 12.5 MG: 6.25 TABLET, FILM COATED ORAL at 20:31

## 2019-01-01 RX ADMIN — POTASSIUM CHLORIDE 40 MEQ: 20 TABLET, EXTENDED RELEASE ORAL at 09:02

## 2019-01-01 RX ADMIN — OXYCODONE HYDROCHLORIDE AND ACETAMINOPHEN 1 TABLET: 5; 325 TABLET ORAL at 20:11

## 2019-01-01 RX ADMIN — FUROSEMIDE 40 MG: 10 INJECTION, SOLUTION INTRAVENOUS at 06:22

## 2019-01-01 RX ADMIN — ENOXAPARIN SODIUM 40 MG: 40 INJECTION SUBCUTANEOUS at 21:01

## 2019-01-01 RX ADMIN — ATORVASTATIN CALCIUM 80 MG: 40 TABLET, FILM COATED ORAL at 21:01

## 2019-01-01 RX ADMIN — GABAPENTIN 600 MG: 300 CAPSULE ORAL at 08:21

## 2019-01-01 RX ADMIN — SODIUM CHLORIDE 500 ML: 9 INJECTION, SOLUTION INTRAVENOUS at 15:28

## 2019-01-01 RX ADMIN — IPRATROPIUM BROMIDE AND ALBUTEROL SULFATE 1 AMPULE: .5; 3 SOLUTION RESPIRATORY (INHALATION) at 15:30

## 2019-01-01 RX ADMIN — FUROSEMIDE 40 MG: 10 INJECTION, SOLUTION INTRAVENOUS at 06:28

## 2019-01-01 RX ADMIN — IOPAMIDOL 80 ML: 755 INJECTION, SOLUTION INTRAVENOUS at 16:02

## 2019-01-01 RX ADMIN — LEVOTHYROXINE SODIUM 50 MCG: 50 TABLET ORAL at 05:16

## 2019-01-01 RX ADMIN — Medication 500 MCG: at 09:09

## 2019-01-01 RX ADMIN — SODIUM CHLORIDE, POTASSIUM CHLORIDE, SODIUM LACTATE AND CALCIUM CHLORIDE: 600; 310; 30; 20 INJECTION, SOLUTION INTRAVENOUS at 11:49

## 2019-01-01 RX ADMIN — Medication 10 ML: at 02:19

## 2019-01-01 RX ADMIN — GABAPENTIN 600 MG: 300 CAPSULE ORAL at 20:32

## 2019-01-01 RX ADMIN — PANTOPRAZOLE SODIUM 40 MG: 40 TABLET, DELAYED RELEASE ORAL at 06:22

## 2019-01-01 RX ADMIN — PANTOPRAZOLE SODIUM 40 MG: 40 TABLET, DELAYED RELEASE ORAL at 06:32

## 2019-01-01 RX ADMIN — LEVOTHYROXINE SODIUM 50 MCG: 50 TABLET ORAL at 16:58

## 2019-01-01 RX ADMIN — LISINOPRIL 10 MG: 10 TABLET ORAL at 08:36

## 2019-01-01 RX ADMIN — DIPHENHYDRAMINE HCL 50 MG: 25 TABLET ORAL at 21:07

## 2019-01-01 RX ADMIN — GABAPENTIN 600 MG: 300 CAPSULE ORAL at 13:57

## 2019-01-01 RX ADMIN — GABAPENTIN 600 MG: 300 CAPSULE ORAL at 16:58

## 2019-01-01 RX ADMIN — GABAPENTIN 600 MG: 300 CAPSULE ORAL at 20:11

## 2019-01-01 RX ADMIN — LEVOTHYROXINE SODIUM 50 MCG: 50 TABLET ORAL at 06:32

## 2019-01-01 RX ADMIN — LISINOPRIL 10 MG: 10 TABLET ORAL at 09:28

## 2019-01-01 RX ADMIN — BACLOFEN 10 MG: 10 TABLET ORAL at 20:30

## 2019-01-01 RX ADMIN — PIPERACILLIN SODIUM AND TAZOBACTAM SODIUM 3.38 G: 3; .375 INJECTION, POWDER, LYOPHILIZED, FOR SOLUTION INTRAVENOUS at 03:17

## 2019-01-01 RX ADMIN — Medication 10 ML: at 06:22

## 2019-01-01 RX ADMIN — BACLOFEN 10 MG: 10 TABLET ORAL at 08:39

## 2019-01-01 RX ADMIN — PIPERACILLIN SODIUM AND TAZOBACTAM SODIUM 3.38 G: 3; .375 INJECTION, POWDER, LYOPHILIZED, FOR SOLUTION INTRAVENOUS at 02:55

## 2019-01-01 RX ADMIN — PIPERACILLIN SODIUM AND TAZOBACTAM SODIUM 3.38 G: 3; .375 INJECTION, POWDER, LYOPHILIZED, FOR SOLUTION INTRAVENOUS at 20:18

## 2019-01-01 RX ADMIN — ESCITALOPRAM OXALATE 20 MG: 10 TABLET ORAL at 16:58

## 2019-01-01 RX ADMIN — LISINOPRIL 10 MG: 10 TABLET ORAL at 09:09

## 2019-01-01 RX ADMIN — CILOSTAZOL 25 MG: 50 TABLET ORAL at 08:22

## 2019-01-01 RX ADMIN — GABAPENTIN 600 MG: 300 CAPSULE ORAL at 09:08

## 2019-01-01 RX ADMIN — GABAPENTIN 600 MG: 300 CAPSULE ORAL at 14:30

## 2019-01-01 RX ADMIN — IPRATROPIUM BROMIDE AND ALBUTEROL SULFATE 1 AMPULE: .5; 3 SOLUTION RESPIRATORY (INHALATION) at 13:06

## 2019-01-01 RX ADMIN — IPRATROPIUM BROMIDE AND ALBUTEROL SULFATE 1 AMPULE: .5; 3 SOLUTION RESPIRATORY (INHALATION) at 16:59

## 2019-01-01 RX ADMIN — ENOXAPARIN SODIUM 40 MG: 40 INJECTION SUBCUTANEOUS at 21:08

## 2019-01-01 RX ADMIN — LEVOTHYROXINE SODIUM 50 MCG: 50 TABLET ORAL at 06:23

## 2019-01-01 RX ADMIN — DOXYCYCLINE 100 MG: 100 INJECTION, POWDER, LYOPHILIZED, FOR SOLUTION INTRAVENOUS at 17:16

## 2019-01-01 RX ADMIN — IPRATROPIUM BROMIDE AND ALBUTEROL SULFATE 1 AMPULE: .5; 3 SOLUTION RESPIRATORY (INHALATION) at 07:47

## 2019-01-01 RX ADMIN — POLYETHYLENE GLYCOL 3350 17 G: 17 POWDER, FOR SOLUTION ORAL at 08:38

## 2019-01-01 RX ADMIN — Medication 10 ML: at 22:02

## 2019-01-01 RX ADMIN — GABAPENTIN 600 MG: 300 CAPSULE ORAL at 09:28

## 2019-01-01 RX ADMIN — Medication 10 ML: at 02:55

## 2019-01-01 RX ADMIN — Medication 10 ML: at 21:08

## 2019-01-01 RX ADMIN — Medication 10 ML: at 06:28

## 2019-01-01 RX ADMIN — GABAPENTIN 600 MG: 300 CAPSULE ORAL at 20:30

## 2019-01-01 RX ADMIN — DEXAMETHASONE SODIUM PHOSPHATE 4 MG: 4 INJECTION, SOLUTION INTRAMUSCULAR; INTRAVENOUS at 05:36

## 2019-01-01 RX ADMIN — IPRATROPIUM BROMIDE AND ALBUTEROL SULFATE 1 AMPULE: .5; 3 SOLUTION RESPIRATORY (INHALATION) at 21:26

## 2019-01-01 RX ADMIN — GABAPENTIN 600 MG: 300 CAPSULE ORAL at 16:10

## 2019-01-01 RX ADMIN — GABAPENTIN 600 MG: 300 CAPSULE ORAL at 15:06

## 2019-01-01 RX ADMIN — DOXYCYCLINE 100 MG: 100 INJECTION, POWDER, LYOPHILIZED, FOR SOLUTION INTRAVENOUS at 05:18

## 2019-01-01 RX ADMIN — DIPHENHYDRAMINE HCL 50 MG: 25 TABLET ORAL at 21:01

## 2019-01-01 RX ADMIN — SODIUM CHLORIDE, POTASSIUM CHLORIDE, SODIUM LACTATE AND CALCIUM CHLORIDE: 600; 310; 30; 20 INJECTION, SOLUTION INTRAVENOUS at 23:35

## 2019-01-01 RX ADMIN — OXYCODONE HYDROCHLORIDE 10 MG: 10 TABLET, FILM COATED, EXTENDED RELEASE ORAL at 00:47

## 2019-01-01 RX ADMIN — Medication 500 MCG: at 08:39

## 2019-01-01 RX ADMIN — BACLOFEN 10 MG: 10 TABLET ORAL at 21:08

## 2019-01-01 RX ADMIN — LISINOPRIL 10 MG: 10 TABLET ORAL at 16:58

## 2019-01-01 RX ADMIN — DOXYCYCLINE 100 MG: 100 INJECTION, POWDER, LYOPHILIZED, FOR SOLUTION INTRAVENOUS at 16:55

## 2019-01-01 RX ADMIN — BACLOFEN 10 MG: 10 TABLET ORAL at 20:52

## 2019-01-01 RX ADMIN — MAGNESIUM HYDROXIDE 30 ML: 400 SUSPENSION ORAL at 15:48

## 2019-01-01 RX ADMIN — ESCITALOPRAM OXALATE 10 MG: 10 TABLET ORAL at 11:07

## 2019-01-01 RX ADMIN — ENOXAPARIN SODIUM 40 MG: 40 INJECTION SUBCUTANEOUS at 21:22

## 2019-01-01 RX ADMIN — OXYCODONE HYDROCHLORIDE AND ACETAMINOPHEN 1 TABLET: 5; 325 TABLET ORAL at 13:04

## 2019-01-01 RX ADMIN — OXYCODONE HYDROCHLORIDE AND ACETAMINOPHEN 1 TABLET: 5; 325 TABLET ORAL at 14:12

## 2019-01-01 RX ADMIN — DOXYCYCLINE 100 MG: 100 INJECTION, POWDER, LYOPHILIZED, FOR SOLUTION INTRAVENOUS at 17:21

## 2019-01-01 RX ADMIN — HYDROMORPHONE HYDROCHLORIDE 0.5 MG: 1 INJECTION, SOLUTION INTRAMUSCULAR; INTRAVENOUS; SUBCUTANEOUS at 11:42

## 2019-01-01 RX ADMIN — PANTOPRAZOLE SODIUM 40 MG: 40 TABLET, DELAYED RELEASE ORAL at 05:36

## 2019-01-01 RX ADMIN — IPRATROPIUM BROMIDE AND ALBUTEROL SULFATE 1 AMPULE: .5; 3 SOLUTION RESPIRATORY (INHALATION) at 09:32

## 2019-01-01 RX ADMIN — BACLOFEN 10 MG: 10 TABLET ORAL at 09:29

## 2019-01-01 RX ADMIN — BACLOFEN 10 MG: 10 TABLET ORAL at 08:36

## 2019-01-01 RX ADMIN — LISINOPRIL 10 MG: 10 TABLET ORAL at 09:47

## 2019-01-01 RX ADMIN — Medication 10 ML: at 19:50

## 2019-01-01 RX ADMIN — OXYCODONE HYDROCHLORIDE AND ACETAMINOPHEN 1 TABLET: 5; 325 TABLET ORAL at 12:27

## 2019-01-01 RX ADMIN — GABAPENTIN 600 MG: 300 CAPSULE ORAL at 09:46

## 2019-01-01 RX ADMIN — PIPERACILLIN SODIUM AND TAZOBACTAM SODIUM 3.38 G: 3; .375 INJECTION, POWDER, LYOPHILIZED, FOR SOLUTION INTRAVENOUS at 12:27

## 2019-01-01 RX ADMIN — HYDROMORPHONE HYDROCHLORIDE 0.5 MG: 1 INJECTION, SOLUTION INTRAMUSCULAR; INTRAVENOUS; SUBCUTANEOUS at 16:54

## 2019-01-01 RX ADMIN — BACLOFEN 10 MG: 10 TABLET ORAL at 16:57

## 2019-01-01 RX ADMIN — IPRATROPIUM BROMIDE AND ALBUTEROL SULFATE 1 AMPULE: .5; 3 SOLUTION RESPIRATORY (INHALATION) at 11:26

## 2019-01-01 RX ADMIN — DIPHENHYDRAMINE HCL 50 MG: 25 TABLET ORAL at 21:02

## 2019-01-01 RX ADMIN — LISINOPRIL 10 MG: 10 TABLET ORAL at 08:39

## 2019-01-01 RX ADMIN — OXYCODONE HYDROCHLORIDE AND ACETAMINOPHEN 1 TABLET: 5; 325 TABLET ORAL at 22:17

## 2019-01-01 RX ADMIN — Medication 10 ML: at 08:23

## 2019-01-01 RX ADMIN — IPRATROPIUM BROMIDE AND ALBUTEROL SULFATE 1 AMPULE: .5; 3 SOLUTION RESPIRATORY (INHALATION) at 15:47

## 2019-01-01 RX ADMIN — ESCITALOPRAM OXALATE 20 MG: 10 TABLET ORAL at 09:46

## 2019-01-01 RX ADMIN — IPRATROPIUM BROMIDE AND ALBUTEROL SULFATE 1 AMPULE: .5; 3 SOLUTION RESPIRATORY (INHALATION) at 11:08

## 2019-01-01 RX ADMIN — CARVEDILOL 12.5 MG: 6.25 TABLET, FILM COATED ORAL at 09:29

## 2019-01-01 RX ADMIN — GABAPENTIN 600 MG: 300 CAPSULE ORAL at 08:36

## 2019-01-01 RX ADMIN — OXYCODONE HYDROCHLORIDE AND ACETAMINOPHEN 1 TABLET: 5; 325 TABLET ORAL at 07:42

## 2019-01-01 RX ADMIN — Medication 10 ML: at 20:11

## 2019-01-01 RX ADMIN — DOXYCYCLINE 100 MG: 100 INJECTION, POWDER, LYOPHILIZED, FOR SOLUTION INTRAVENOUS at 16:49

## 2019-01-01 RX ADMIN — IPRATROPIUM BROMIDE AND ALBUTEROL SULFATE 1 AMPULE: .5; 3 SOLUTION RESPIRATORY (INHALATION) at 16:08

## 2019-01-01 RX ADMIN — Medication 10 ML: at 21:02

## 2019-01-01 RX ADMIN — DEXAMETHASONE SODIUM PHOSPHATE 4 MG: 4 INJECTION, SOLUTION INTRAMUSCULAR; INTRAVENOUS at 11:57

## 2019-01-01 RX ADMIN — ALPRAZOLAM 0.5 MG: 0.25 TABLET ORAL at 08:38

## 2019-01-01 ASSESSMENT — PAIN DESCRIPTION - ONSET
ONSET: ON-GOING
ONSET: PROGRESSIVE
ONSET: ON-GOING

## 2019-01-01 ASSESSMENT — ENCOUNTER SYMPTOMS
CHEST TIGHTNESS: 0
VOMITING: 0
COUGH: 1
PHOTOPHOBIA: 0
SHORTNESS OF BREATH: 1
BACK PAIN: 0
ABDOMINAL PAIN: 0
SORE THROAT: 0
DIARRHEA: 0
NAUSEA: 0
SHORTNESS OF BREATH: 0
SINUS PAIN: 0
TROUBLE SWALLOWING: 0
ABDOMINAL PAIN: 0

## 2019-01-01 ASSESSMENT — PAIN DESCRIPTION - ORIENTATION
ORIENTATION: MID
ORIENTATION: MID;LOWER
ORIENTATION: LOWER;MID
ORIENTATION: LOWER;MID
ORIENTATION: MID
ORIENTATION: LOWER
ORIENTATION: MID
ORIENTATION: LOWER;RIGHT;LEFT
ORIENTATION: LOWER;MID
ORIENTATION: MID
ORIENTATION: MID
ORIENTATION: LOWER;MID
ORIENTATION: LOWER;MID
ORIENTATION: LOWER
ORIENTATION: MID
ORIENTATION: LOWER
ORIENTATION: LOWER;MID
ORIENTATION: MID;LOWER
ORIENTATION: MID
ORIENTATION: LOWER
ORIENTATION: LOWER;MID
ORIENTATION: LOWER
ORIENTATION: LOWER;MID
ORIENTATION: MID
ORIENTATION: MID;LOWER
ORIENTATION: MID
ORIENTATION: MID

## 2019-01-01 ASSESSMENT — PAIN - FUNCTIONAL ASSESSMENT
PAIN_FUNCTIONAL_ASSESSMENT: PREVENTS OR INTERFERES WITH MANY ACTIVE NOT PASSIVE ACTIVITIES
PAIN_FUNCTIONAL_ASSESSMENT: PREVENTS OR INTERFERES SOME ACTIVE ACTIVITIES AND ADLS
PAIN_FUNCTIONAL_ASSESSMENT: PREVENTS OR INTERFERES WITH ALL ACTIVE AND SOME PASSIVE ACTIVITIES
PAIN_FUNCTIONAL_ASSESSMENT: PREVENTS OR INTERFERES WITH MANY ACTIVE NOT PASSIVE ACTIVITIES
PAIN_FUNCTIONAL_ASSESSMENT: PREVENTS OR INTERFERES SOME ACTIVE ACTIVITIES AND ADLS
PAIN_FUNCTIONAL_ASSESSMENT: ACTIVITIES ARE NOT PREVENTED
PAIN_FUNCTIONAL_ASSESSMENT: PREVENTS OR INTERFERES SOME ACTIVE ACTIVITIES AND ADLS
PAIN_FUNCTIONAL_ASSESSMENT: PREVENTS OR INTERFERES WITH ALL ACTIVE AND SOME PASSIVE ACTIVITIES
PAIN_FUNCTIONAL_ASSESSMENT: PREVENTS OR INTERFERES WITH MANY ACTIVE NOT PASSIVE ACTIVITIES
PAIN_FUNCTIONAL_ASSESSMENT: ACTIVITIES ARE NOT PREVENTED
PAIN_FUNCTIONAL_ASSESSMENT: PREVENTS OR INTERFERES SOME ACTIVE ACTIVITIES AND ADLS
PAIN_FUNCTIONAL_ASSESSMENT: PREVENTS OR INTERFERES SOME ACTIVE ACTIVITIES AND ADLS
PAIN_FUNCTIONAL_ASSESSMENT: PREVENTS OR INTERFERES WITH MANY ACTIVE NOT PASSIVE ACTIVITIES
PAIN_FUNCTIONAL_ASSESSMENT: PREVENTS OR INTERFERES WITH ALL ACTIVE AND SOME PASSIVE ACTIVITIES
PAIN_FUNCTIONAL_ASSESSMENT: PREVENTS OR INTERFERES WITH MANY ACTIVE NOT PASSIVE ACTIVITIES
PAIN_FUNCTIONAL_ASSESSMENT: PREVENTS OR INTERFERES SOME ACTIVE ACTIVITIES AND ADLS
PAIN_FUNCTIONAL_ASSESSMENT: PREVENTS OR INTERFERES WITH ALL ACTIVE AND SOME PASSIVE ACTIVITIES
PAIN_FUNCTIONAL_ASSESSMENT: PREVENTS OR INTERFERES WITH MANY ACTIVE NOT PASSIVE ACTIVITIES
PAIN_FUNCTIONAL_ASSESSMENT: PREVENTS OR INTERFERES SOME ACTIVE ACTIVITIES AND ADLS
PAIN_FUNCTIONAL_ASSESSMENT: ACTIVITIES ARE NOT PREVENTED
PAIN_FUNCTIONAL_ASSESSMENT: PREVENTS OR INTERFERES WITH MANY ACTIVE NOT PASSIVE ACTIVITIES
PAIN_FUNCTIONAL_ASSESSMENT: PREVENTS OR INTERFERES SOME ACTIVE ACTIVITIES AND ADLS
PAIN_FUNCTIONAL_ASSESSMENT: ACTIVITIES ARE NOT PREVENTED
PAIN_FUNCTIONAL_ASSESSMENT: ACTIVITIES ARE NOT PREVENTED
PAIN_FUNCTIONAL_ASSESSMENT: PREVENTS OR INTERFERES WITH MANY ACTIVE NOT PASSIVE ACTIVITIES
PAIN_FUNCTIONAL_ASSESSMENT: ACTIVITIES ARE NOT PREVENTED
PAIN_FUNCTIONAL_ASSESSMENT: PREVENTS OR INTERFERES WITH MANY ACTIVE NOT PASSIVE ACTIVITIES
PAIN_FUNCTIONAL_ASSESSMENT: PREVENTS OR INTERFERES SOME ACTIVE ACTIVITIES AND ADLS
PAIN_FUNCTIONAL_ASSESSMENT: PREVENTS OR INTERFERES SOME ACTIVE ACTIVITIES AND ADLS
PAIN_FUNCTIONAL_ASSESSMENT: PREVENTS OR INTERFERES WITH MANY ACTIVE NOT PASSIVE ACTIVITIES
PAIN_FUNCTIONAL_ASSESSMENT: PREVENTS OR INTERFERES WITH MANY ACTIVE NOT PASSIVE ACTIVITIES

## 2019-01-01 ASSESSMENT — PAIN DESCRIPTION - PROGRESSION
CLINICAL_PROGRESSION: GRADUALLY WORSENING
CLINICAL_PROGRESSION: NOT CHANGED
CLINICAL_PROGRESSION: GRADUALLY WORSENING
CLINICAL_PROGRESSION: GRADUALLY IMPROVING
CLINICAL_PROGRESSION: NOT CHANGED
CLINICAL_PROGRESSION: NOT CHANGED
CLINICAL_PROGRESSION: GRADUALLY WORSENING
CLINICAL_PROGRESSION: GRADUALLY IMPROVING
CLINICAL_PROGRESSION: GRADUALLY IMPROVING
CLINICAL_PROGRESSION: GRADUALLY WORSENING
CLINICAL_PROGRESSION: NOT CHANGED
CLINICAL_PROGRESSION: NOT CHANGED
CLINICAL_PROGRESSION: GRADUALLY IMPROVING
CLINICAL_PROGRESSION: GRADUALLY WORSENING
CLINICAL_PROGRESSION: GRADUALLY WORSENING
CLINICAL_PROGRESSION: NOT CHANGED

## 2019-01-01 ASSESSMENT — PAIN DESCRIPTION - FREQUENCY
FREQUENCY: CONTINUOUS
FREQUENCY: CONTINUOUS
FREQUENCY: INTERMITTENT
FREQUENCY: CONTINUOUS
FREQUENCY: INTERMITTENT
FREQUENCY: CONTINUOUS
FREQUENCY: INTERMITTENT
FREQUENCY: CONTINUOUS
FREQUENCY: INTERMITTENT
FREQUENCY: CONTINUOUS

## 2019-01-01 ASSESSMENT — PAIN DESCRIPTION - DESCRIPTORS
DESCRIPTORS: ACHING
DESCRIPTORS: ACHING
DESCRIPTORS: ACHING;DISCOMFORT;DULL
DESCRIPTORS: ACHING;CONSTANT;DISCOMFORT;DULL
DESCRIPTORS: ACHING;CONSTANT;DISCOMFORT
DESCRIPTORS: ACHING;DISCOMFORT
DESCRIPTORS: DISCOMFORT
DESCRIPTORS: ACHING;DISCOMFORT
DESCRIPTORS: ACHING;CONSTANT;DISCOMFORT
DESCRIPTORS: ACHING;DISCOMFORT
DESCRIPTORS: ACHING;CONSTANT;DISCOMFORT;CRUSHING
DESCRIPTORS: ACHING;DISCOMFORT;DULL
DESCRIPTORS: ACHING;DISCOMFORT
DESCRIPTORS: ACHING;SHARP
DESCRIPTORS: ACHING;DISCOMFORT;DULL
DESCRIPTORS: ACHING;CONSTANT;DISCOMFORT
DESCRIPTORS: ACHING;DISCOMFORT
DESCRIPTORS: ACHING
DESCRIPTORS: ACHING;DISCOMFORT;DULL
DESCRIPTORS: ACHING;SHARP
DESCRIPTORS: ACHING
DESCRIPTORS: ACHING;SHARP
DESCRIPTORS: ACHING
DESCRIPTORS: ACHING;DISCOMFORT;DULL
DESCRIPTORS: ACHING
DESCRIPTORS: ACHING
DESCRIPTORS: ACHING;DISCOMFORT;DULL
DESCRIPTORS: ACHING
DESCRIPTORS: ACHING;DISCOMFORT;DULL
DESCRIPTORS: ACHING
DESCRIPTORS: ACHING;DISCOMFORT;CONSTANT
DESCRIPTORS: ACHING;CONSTANT;DISCOMFORT
DESCRIPTORS: ACHING
DESCRIPTORS: DISCOMFORT
DESCRIPTORS: ACHING

## 2019-01-01 ASSESSMENT — PAIN DESCRIPTION - LOCATION
LOCATION: NECK
LOCATION: BACK;NECK
LOCATION: BACK
LOCATION: NECK;BACK
LOCATION: NECK
LOCATION: BACK
LOCATION: NECK
LOCATION: BACK
LOCATION: BACK
LOCATION: NECK
LOCATION: BACK
LOCATION: BACK
LOCATION: BACK;NECK
LOCATION: NECK
LOCATION: BACK;LEG
LOCATION: NECK
LOCATION: BACK
LOCATION: BACK;NECK
LOCATION: NECK
LOCATION: NECK
LOCATION: BACK
LOCATION: NECK
LOCATION: NECK
LOCATION: BACK
LOCATION: NECK
LOCATION: BACK;NECK
LOCATION: BACK
LOCATION: NECK
LOCATION: BACK
LOCATION: NECK

## 2019-01-01 ASSESSMENT — PAIN SCALES - GENERAL
PAINLEVEL_OUTOF10: 0
PAINLEVEL_OUTOF10: 6
PAINLEVEL_OUTOF10: 8
PAINLEVEL_OUTOF10: 0
PAINLEVEL_OUTOF10: 6
PAINLEVEL_OUTOF10: 7
PAINLEVEL_OUTOF10: 7
PAINLEVEL_OUTOF10: 5
PAINLEVEL_OUTOF10: 10
PAINLEVEL_OUTOF10: 0
PAINLEVEL_OUTOF10: 10
PAINLEVEL_OUTOF10: 7
PAINLEVEL_OUTOF10: 0
PAINLEVEL_OUTOF10: 7
PAINLEVEL_OUTOF10: 7
PAINLEVEL_OUTOF10: 0
PAINLEVEL_OUTOF10: 7
PAINLEVEL_OUTOF10: 0
PAINLEVEL_OUTOF10: 8
PAINLEVEL_OUTOF10: 10
PAINLEVEL_OUTOF10: 8
PAINLEVEL_OUTOF10: 0
PAINLEVEL_OUTOF10: 8
PAINLEVEL_OUTOF10: 9
PAINLEVEL_OUTOF10: 4
PAINLEVEL_OUTOF10: 5
PAINLEVEL_OUTOF10: 0
PAINLEVEL_OUTOF10: 6
PAINLEVEL_OUTOF10: 8
PAINLEVEL_OUTOF10: 8
PAINLEVEL_OUTOF10: 6
PAINLEVEL_OUTOF10: 4
PAINLEVEL_OUTOF10: 6
PAINLEVEL_OUTOF10: 0
PAINLEVEL_OUTOF10: 0
PAINLEVEL_OUTOF10: 9
PAINLEVEL_OUTOF10: 8
PAINLEVEL_OUTOF10: 8
PAINLEVEL_OUTOF10: 0
PAINLEVEL_OUTOF10: 10
PAINLEVEL_OUTOF10: 8
PAINLEVEL_OUTOF10: 7
PAINLEVEL_OUTOF10: 0
PAINLEVEL_OUTOF10: 9
PAINLEVEL_OUTOF10: 0
PAINLEVEL_OUTOF10: 8
PAINLEVEL_OUTOF10: 6
PAINLEVEL_OUTOF10: 0
PAINLEVEL_OUTOF10: 9
PAINLEVEL_OUTOF10: 7
PAINLEVEL_OUTOF10: 3
PAINLEVEL_OUTOF10: 0
PAINLEVEL_OUTOF10: 7
PAINLEVEL_OUTOF10: 8
PAINLEVEL_OUTOF10: 8
PAINLEVEL_OUTOF10: 7
PAINLEVEL_OUTOF10: 8
PAINLEVEL_OUTOF10: 4
PAINLEVEL_OUTOF10: 8
PAINLEVEL_OUTOF10: 9
PAINLEVEL_OUTOF10: 7
PAINLEVEL_OUTOF10: 7
PAINLEVEL_OUTOF10: 6
PAINLEVEL_OUTOF10: 5
PAINLEVEL_OUTOF10: 4
PAINLEVEL_OUTOF10: 6
PAINLEVEL_OUTOF10: 7
PAINLEVEL_OUTOF10: 8
PAINLEVEL_OUTOF10: 5
PAINLEVEL_OUTOF10: 9
PAINLEVEL_OUTOF10: 0
PAINLEVEL_OUTOF10: 8
PAINLEVEL_OUTOF10: 4
PAINLEVEL_OUTOF10: 7
PAINLEVEL_OUTOF10: 6
PAINLEVEL_OUTOF10: 8
PAINLEVEL_OUTOF10: 8
PAINLEVEL_OUTOF10: 9
PAINLEVEL_OUTOF10: 0
PAINLEVEL_OUTOF10: 0
PAINLEVEL_OUTOF10: 8
PAINLEVEL_OUTOF10: 3
PAINLEVEL_OUTOF10: 7
PAINLEVEL_OUTOF10: 0
PAINLEVEL_OUTOF10: 8
PAINLEVEL_OUTOF10: 9

## 2019-01-01 ASSESSMENT — PAIN DESCRIPTION - PAIN TYPE
TYPE: ACUTE PAIN
TYPE: CHRONIC PAIN
TYPE: ACUTE PAIN
TYPE: ACUTE PAIN;CHRONIC PAIN
TYPE: ACUTE PAIN
TYPE: CHRONIC PAIN
TYPE: ACUTE PAIN
TYPE: ACUTE PAIN;CHRONIC PAIN
TYPE: ACUTE PAIN
TYPE: ACUTE PAIN;CHRONIC PAIN
TYPE: ACUTE PAIN
TYPE: CHRONIC PAIN
TYPE: ACUTE PAIN
TYPE: CHRONIC PAIN
TYPE: CHRONIC PAIN
TYPE: ACUTE PAIN
TYPE: ACUTE PAIN;CHRONIC PAIN
TYPE: CHRONIC PAIN
TYPE: CHRONIC PAIN
TYPE: ACUTE PAIN
TYPE: CHRONIC PAIN
TYPE: ACUTE PAIN
TYPE: CHRONIC PAIN
TYPE: ACUTE PAIN
TYPE: CHRONIC PAIN
TYPE: ACUTE PAIN
TYPE: ACUTE PAIN;CHRONIC PAIN
TYPE: CHRONIC PAIN
TYPE: ACUTE PAIN
TYPE: ACUTE PAIN;CHRONIC PAIN
TYPE: ACUTE PAIN
TYPE: ACUTE PAIN

## 2019-08-04 PROBLEM — J18.9 PNEUMONIA: Status: ACTIVE | Noted: 2019-01-01

## 2019-08-04 NOTE — ED PROVIDER NOTES
This is a 71 yo M presenting with cp and back pain worsening over the pas 3 weeks. He also had hemoptysis 3 weeks ago, has been dry coughing since. He is on asa, plavix and cilostazol and has an extensive history of HTN, COPD continuing to smoke, hypothyroidism, PVD, femoral artery aneurysm, ischemic cardiomyopathy, HLD, previously saw Dr. Josemanuel Bernard. Has had multiple stents and cabg back in the 80s. The history is provided by the patient. No  was used. Review of Systems   Constitutional: Negative for activity change, chills, fatigue and fever. HENT: Negative for congestion, sinus pain and sore throat. Eyes: Negative for visual disturbance. Respiratory: Positive for cough and shortness of breath. Negative for chest tightness. Cardiovascular: Positive for chest pain. Negative for palpitations and leg swelling. Gastrointestinal: Negative for abdominal pain, diarrhea, nausea and vomiting. Genitourinary: Negative for dysuria and urgency. Musculoskeletal: Negative for back pain, neck pain and neck stiffness. Skin: Negative for rash. Neurological: Negative for dizziness, syncope and headaches. Psychiatric/Behavioral: Negative for confusion. Physical Exam   Constitutional: He is oriented to person, place, and time. No distress. Chronically ill appearing male in NAD   HENT:   Head: Normocephalic and atraumatic. Nose: Nose normal.   Mouth/Throat: Oropharynx is clear and moist. No oropharyngeal exudate. Eyes: Pupils are equal, round, and reactive to light. Conjunctivae and EOM are normal.   Neck: Normal range of motion. Neck supple. Cardiovascular: Normal rate, regular rhythm, normal heart sounds and intact distal pulses. Pulmonary/Chest: Effort normal. No respiratory distress. He exhibits no tenderness. Coarse bilaterally   Abdominal: Soft. Bowel sounds are normal. He exhibits no distension. There is no tenderness. Musculoskeletal: Normal range of motion. He exhibits edema. LE edema bilaterally   Neurological: He is alert and oriented to person, place, and time. No cranial nerve deficit or sensory deficit. He exhibits normal muscle tone. Skin: Skin is warm and dry. Capillary refill takes less than 2 seconds. He is not diaphoretic. Psychiatric: He has a normal mood and affect. His behavior is normal. Judgment and thought content normal.   Nursing note and vitals reviewed. Procedures    MDM  Number of Diagnoses or Management Options  Chest pain, unspecified type:   Chronic obstructive pulmonary disease, unspecified COPD type ([address]):   Congestive heart failure, unspecified HF chronicity, unspecified heart failure type ([address]):   Edema, unspecified type:   Hemoptysis:   Pneumonia due to organism:   Diagnosis management comments: This is a 69 yo M presenting with CP and back pain x 3 weeks, he also had hemoptysis 3 weeks ago. Concerned for PE. Pulses equal bilaterally, less concern for dissection. Initial vitals stable, BP drops in department. Gentle fluids started, this does improve his BP. CT shows pneumonia with pleural effusions, edema and PNA. Cultures obtained; Abx started over concerns for aspiration as his family states he coughs a lot after he eats. Patient will benefit from IV abx, oxygen to maintain sats as he was hypoxic on arrival, and likely thoracentesis. Discussed with patient and Dr. Jocelyn Porter, will admit under Dr. Juan Manuel Gee. Patient condition improved during ED stay. Amount and/or Complexity of Data Reviewed  Clinical lab tests: ordered and reviewed  Tests in the radiology section of CPT®: ordered and reviewed        ED Course as of Aug 04 1722   Sun Aug 04, 2019   1518 BP now 86/45, will check on other arm and order fluids. Last EF in 2016 was 55%. [CW]   9345 Patient over in imaging. [CW]   46 Paged Dr. Juan Manuel Gee for PNA.  Started zosyn doxy to cover possible aspiration PNA.    [CW]   5163 Patient resting comfortably, agrees with 08/04/19   CBC Auto Differential   Result Value Ref Range    WBC 8.5 4.5 - 11.5 E9/L    RBC 4.30 3.80 - 5.80 E12/L    Hemoglobin 14.1 12.5 - 16.5 g/dL    Hematocrit 44.1 37.0 - 54.0 %    .6 (H) 80.0 - 99.9 fL    MCH 32.8 26.0 - 35.0 pg    MCHC 32.0 32.0 - 34.5 %    RDW 14.4 11.5 - 15.0 fL    Platelets 331 804 - 406 E9/L    MPV 11.6 7.0 - 12.0 fL    Neutrophils % 77.6 43.0 - 80.0 %    Immature Granulocytes % 0.2 0.0 - 5.0 %    Lymphocytes % 10.0 (L) 20.0 - 42.0 %    Monocytes % 4.0 2.0 - 12.0 %    Eosinophils % 7.5 (H) 0.0 - 6.0 %    Basophils % 0.7 0.0 - 2.0 %    Neutrophils # 6.61 1.80 - 7.30 E9/L    Immature Granulocytes # 0.02 E9/L    Lymphocytes # 0.85 (L) 1.50 - 4.00 E9/L    Monocytes # 0.34 0.10 - 0.95 E9/L    Eosinophils # 0.64 (H) 0.05 - 0.50 E9/L    Basophils # 0.06 0.00 - 0.20 F2/W   Basic Metabolic Panel w/ Reflex to MG   Result Value Ref Range    Sodium 139 132 - 146 mmol/L    Potassium reflex Magnesium 3.9 3.5 - 5.0 mmol/L    Chloride 97 (L) 98 - 107 mmol/L    CO2 32 (H) 22 - 29 mmol/L    Anion Gap 10 7 - 16 mmol/L    Glucose 161 (H) 74 - 99 mg/dL    BUN 17 8 - 23 mg/dL    CREATININE 1.0 0.7 - 1.2 mg/dL    GFR Non-African American >60 >=60 mL/min/1.73    GFR African American >60     Calcium 8.4 (L) 8.6 - 10.2 mg/dL   Troponin   Result Value Ref Range    Troponin <0.01 0.00 - 0.03 ng/mL   Brain Natriuretic Peptide   Result Value Ref Range    Pro-BNP 2,547 (H) 0 - 450 pg/mL   Protime-INR   Result Value Ref Range    Protime 14.2 (H) 9.3 - 12.4 sec    INR 1.3    APTT   Result Value Ref Range    aPTT 29.8 24.5 - 35.1 sec   EKG 12 Lead   Result Value Ref Range    Ventricular Rate 56 BPM    Atrial Rate 56 BPM    P-R Interval 250 ms    QRS Duration 116 ms    Q-T Interval 470 ms    QTc Calculation (Bazett) 453 ms    P Axis 25 degrees    R Axis -59 degrees    T Axis -101 degrees       RADIOLOGY:    All Radiology results interpreted by Radiologist unless otherwise noted.   CTA PULMONARY W CONTRAST   Final

## 2019-08-05 NOTE — CONSULTS
Not on file     Non-medical: Not on file   Tobacco Use    Smoking status: Current Every Day Smoker     Packs/day: 0.50     Years: 30.00     Pack years: 15.00     Types: Cigars, Cigarettes    Smokeless tobacco: Never Used    Tobacco comment: Smokes 1 cigar a day; quit cigarettes 35 yrs ago 12/13/18   Substance and Sexual Activity    Alcohol use: Yes     Comment: rare     Drug use: No    Sexual activity: Not on file   Lifestyle    Physical activity:     Days per week: Not on file     Minutes per session: Not on file    Stress: Not on file   Relationships    Social connections:     Talks on phone: Not on file     Gets together: Not on file     Attends Orthodox service: Not on file     Active member of club or organization: Not on file     Attends meetings of clubs or organizations: Not on file     Relationship status: Not on file    Intimate partner violence:     Fear of current or ex partner: Not on file     Emotionally abused: Not on file     Physically abused: Not on file     Forced sexual activity: Not on file   Other Topics Concern    Not on file   Social History Narrative    Not on file        Family History   Problem Relation Age of Onset    Heart Disease Father        ROS:   General: No unusual weight gain,  change in exercise tolerance  Skin: No rash or itching  EENT: No vision changes or nosebleeds  Cardiovascular: No orthopnea or paroxysmal nocturnal dyspnea  Respiratory: (+) cough no hemoptysis  Gastrointestinal: No hematemesis or recent changes in bowel habits  Genitourinary: No hematuria, urgency or frequency  Musculoskeletal: No muscular weakness or joint swelling   Neurologic / Psychiatric: No incoordination or convulsions  Allergic / Immunologic/ Lymphatic / Endocrine: No anemia or bleeding tendency    Physical Exam:   Vitals:    08/05/19 0020 08/05/19 0405 08/05/19 0800 08/05/19 0858   BP: (!) 118/53  (!) 140/61    Pulse: 53  56    Resp: 16  16 16   Temp: 98.1 °F (36.7 °C)  98.1 °F (36.7

## 2019-08-05 NOTE — CONSULTS
Pulmonary Consultation    Admit Date: 8/4/2019  Requesting Physician: Demetrice Pacheco MD    CC:  · Pleural effusions, cavitary lung lesion  HPI:  · Maddi Cervantes is a 51-year-old white male known to me from taking care of his wife for years. The patient presented with increasing shortness of breath, cough and hemoptysis of several days duration. At the insistence of his wife, he presented to the emergency room. · Seen and evaluated there, chest CT evidence bibasilar pleural effusions, peripheral honeycombing of the lung as well as a right lower lobe cavitary lesion. The patient is a lifelong smoker having smoked camels for 30 years followed by cigars for an additional 30 years. He admits to some ankle swelling but no fever chills or night sweats.     PMH:    Past Medical History:   Diagnosis Date    Aneurysm of abdominal aorta ([address] 9/14/2015    Arrhythmia     Bilateral carotid artery stenosis 9/23/2015    CAD (coronary artery disease)     Cancer (HCC)     lip    Carotid bruit 9/3/2015    CHF (congestive heart failure) ([address]     pt unaware of DX, has spoken with PCP    COPD (chronic obstructive pulmonary disease) ([address]     Depression     Femoral artery aneurysm, left ([address] 2/18/2016    Femoral artery pseudo-aneurysm, left ([address] 11/2/2017    History of angioplasty of peripheral vessel 9/8/2016    Hyperlipidemia     Hypertension     Hypothyroidism     Left carotid stenosis 9/3/2015    Lymphedema of lower extremity 9/3/2015    Onychomycosis 9/3/2015    PVD (peripheral vascular disease) with claudication ([address] 9/3/2015    S/P carotid endarterectomy 2/18/2016    Spinal stenosis     STEMI (ST elevation myocardial infarction) (HCC)     Swelling of lower limb 9/3/2015    Thyroid disease      PSH:   Past Surgical History:   Procedure Laterality Date    APPENDECTOMY      CARDIAC SURGERY  2000    bypass dr Ashtyn Matso Right 11-2-15    Dr. Felicie Romberg CAROTID ENDARTERECTOMY Left 10/31/2016    Dr. Myra Rowley lense implants    FEMORAL BYPASS  2007    LAMINECTOMY  2004    OTHER SURGICAL HISTORY  1/8/16    Athrectomy/angioplasty R SFA/Popliteal    SKIN CANCER EXCISION  2011    lip-Dr Mia Loera TONSILLECTOMY         Review of Systems:   · Constitutional: As noted in the HPI. Denies fever or chills. · Eyes: No visual changes or diplopia. No scleral icterus. · ENT: No headaches, hearing loss or vertigo. No nasal congestion, or sore throat. · Cardiovascular: No chest pain, dyspnea on exertion, or palpitations. · Respiratory: see above  · Gastrointestinal: No abdominal pain, nausea or emesis. No diarrhea or rectal bleeding or melena. No change in bowel habits. · Genitourinary: No dysuria, urinary frequency, or incontinence. No hematuria. · Musculoskeletal: No gait disturbance, weakness or joint complaints. · Integumentary: No rash or pruritis. No abnormal pigmentation, hair or nail changes. · Neurological: No headache, diplopia, dizziness, tremor, change in muscle strength, numbness or tingling. No change in gait, balance, coordination, mood, affect, memory, mentation, behavior. · Psychiatric: No anxiety or depression. · Endocrine: No temperature intolerance, excessive thirst, fluid intake, urinary frequency, excessive appetite, or recent weight change. · Hematologic/Lymphatic: No abnormal bruising or bleeding, blood clots or swollen lymph nodes. No anemia, fever, chills, night sweats, or swollen glands. · Allergic/Immunologic: No seasonal or perenial allergies. No history of hives or atopic dermatitis.     Social History:  · Alcohol:  No  · Tobacco:   Ongoing  · Employment:  no silica or asbestos exposure  · Family:  No family history of lung disease    Medications:     furosemide  40 mg Intravenous Daily    aspirin  162.5 mg Oral Daily    atorvastatin  80 mg Oral Nightly    baclofen 10 mg Oral BID    carvedilol  12.5 mg Oral BID    cholestyramine  4 g Oral BID    cilostazol  25 mg Oral BID    clopidogrel  75 mg Oral Daily    escitalopram  10 mg Oral Daily    gabapentin  600 mg Oral TID    levothyroxine  50 mcg Oral Daily    lisinopril  10 mg Oral Daily    sodium chloride flush  10 mL Intravenous 2 times per day    ipratropium-albuterol  1 ampule Inhalation Q4H WA    piperacillin-tazobactam  3.375 g Intravenous Q8H    doxycycline (VIBRAMYCIN) IV  100 mg Intravenous Q12H       Vitals:  Tmax:  VITALS:  BP (!) 140/61   Pulse 56   Temp 98.1 °F (36.7 °C) (Oral)   Resp 16   Ht 5' 7\" (1.702 m)   Wt 168 lb 9.6 oz (76.5 kg)   SpO2 97%   BMI 26.41 kg/m²   24HR INTAKE/OUTPUT:      Intake/Output Summary (Last 24 hours) at 2019 1234  Last data filed at 2019 0608  Gross per 24 hour   Intake 120 ml   Output 175 ml   Net -55 ml     CURRENT PULSE OXIMETRY:  SpO2: 97 %  24HR PULSE OXIMETRY RANGE:  SpO2  Av %  Min: 78 %  Max: 98 %    EXAM:  General: No distress. Alert. Eyes: PERRL. No sclera icterus. No conjunctival injection. ENT: No discharge. Pharynx clear. Neck: Trachea midline. Normal thyroid. No jvd, no hjr. Resp: No wheezing. No accessory muscle use. Bibasilar rales. No rhonchi. CV: Regular rate. Regular rhythm. No murmur No rub. Abd: Non-tender. Non-distended. No masses. No organmegaly. Normal bowel sounds. Skin: Warm and dry. No nodule on exposed extremities. No rash on exposed extremities. Lymph: No cervical LAD. No supraclavicular LAD. Ext: No joint deformity. No clubbing. No cyanosis. 2+ edema  Neuro: Awake. Follows commands. Positive pupils/gag/corneals. Normal pain response.      Lab Results:  CBC:   Recent Labs     19  1443 19  0640   WBC 8.5 8.4   HGB 14.1 12.5   HCT 44.1 39.0   .6* 104.0*    135       BMP:  Recent Labs     19  1443 19  0640    142   K 3.9 3.7   CL 97* 102   CO2 32* 34*   BUN 17 15 CREATININE 1.0 0.9    ALB:3,BILIDIR:3,BILITOT:3,ALKPHOS:3)@    PT/INR:   Recent Labs     08/04/19  1443   PROTIME 14.2*   INR 1.3       Cultures:  Sputum: not available  Blood: not available    ABG:   No results for input(s): PH, PO2, PCO2, HCO3, BE, O2SAT, METHB, O2HB, COHB, O2CON, HHB, THB in the last 72 hours. Films:     CTA PULMONARY W CONTRAST   Final Result      1. No evidence of pulmonary embolism. 2. Large bilateral pleural effusions. 3. Diffuse airspace and interstitial opacities throughout both lungs   related to interstitial pulmonary edema or pneumonia. 4. Cavitary nodule with thick surrounding wall located within right   upper lobe related to infectious or neoplastic etiology. 5. Cardiomegaly with pulmonary vascular congestion. 6. Peribronchial inflammatory changes. 7. Mediastinal and hilar lymphadenopathy. 8. Compression fracture at T8 of uncertain chronicity. Stable   compression fracture at L1 when correlated with MRI lumbar spine from   July 24, 2019.      9. Findings suggestive of emphysema. 10. Cardiomegaly with evidence of coronary arterial bypass grafting. ALERT:  THIS IS AN ABNORMAL REPORT      XR CHEST PORTABLE   Final Result   Congestive heart failure. .      Assessment:  1. Bilateral pleural effusions L>R. Parapneumonic versus CHF  2. Bilateral pulmonary fibrosis, possibly secondary to COPD  3. Cavitary RLL lesion, infectious vs malignant. Plan:  1. Left-sided thoracentesis initially  2. Followed by right-sided thoracentesis after 24 hours      Thanks for letting us see this patient in consultation. Please contact us with any questions. Office [phone] or after hours through [employer], x [phone]. Please note that voice recognition technology was used in the preparation of this note and make therefore it may contain inadvertent transcription errors    Patricia Velasquez M.D., F.C.C.P.     [facility]

## 2019-08-05 NOTE — PROGRESS NOTES
Physical Therapy    Facility/Department: [address] INTERNAL MEDICINE 2  Initial Assessment    NAME: Aurelia Romo  : 1940  MRN: [id number]    Date of Service: 2019       REQUIRES PT FOLLOW UP: Yes       Patient Diagnosis(es): The primary encounter diagnosis was Pneumonia due to organism. Diagnoses of Congestive heart failure, unspecified HF chronicity, unspecified heart failure type ([address] Chronic obstructive pulmonary disease, unspecified COPD type ([address] Hemoptysis, Edema, unspecified type, and Chest pain, unspecified type were also pertinent to this visit. has a past medical history of Aneurysm of abdominal aorta ([address] Arrhythmia, Bilateral carotid artery stenosis, CAD (coronary artery disease), Cancer ([address] Carotid bruit, CHF (congestive heart failure) ([address] COPD (chronic obstructive pulmonary disease) ([address] Depression, Femoral artery aneurysm, left ([address] Femoral artery pseudo-aneurysm, left ([address] History of angioplasty of peripheral vessel, Hyperlipidemia, Hypertension, Hypothyroidism, Left carotid stenosis, Lymphedema of lower extremity, Onychomycosis, PVD (peripheral vascular disease) with claudication ([address] S/P carotid endarterectomy, Spinal stenosis, STEMI (ST elevation myocardial infarction) ([address] Swelling of lower limb, and Thyroid disease. has a past surgical history that includes Cardiac surgery (); Appendectomy; Tonsillectomy; femoral bypass (); Cholecystectomy; Skin cancer excision (); laminectomy (); Colonoscopy; eye surgery; Carotid endarterectomy (Right, 11-2-15); other surgical history (16); and Carotid endarterectomy (Left, 10/31/2016). Evaluating Therapist: Franky Angeles PT       Room #: 5   DIAGNOSIS: pneumonia     PRECAUTIONS: falls, O2@ 3 LNC     Social:  Pt lives with wife  in a  2  floor plan   steps and 1 rails to enter.   Prior to admission pt walked with no AD, Has cane, std walker, rollator      Initial Evaluation  Date: 19

## 2019-08-05 NOTE — PROGRESS NOTES
SPEECH/LANGUAGE PATHOLOGY  BEDSIDE SWALLOWING EVALUATION    PATIENT NAME:  Keny Nash      :  1940      TODAY'S DATE:  2019  [facility]  [id number]    SUMMARY OF EVALUATION     DYSPHAGIA DIAGNOSIS:  Oropharyngeal swallow WFL at time of evaluation      DIET RECOMMENDATIONS:  Regular consistency solids with  thin liquids as tolerated     FEEDING RECOMMENDATIONS:     Assistance level:  No assistance needed      Compensatory strategies recommended: Alternate solids and liquids due to edentulous status and min oral residue noted  THERAPY RECOMMENDATIONS:      Dysphagia therapy is recommended X1-2 times with emphasis on the following and To monitor oral intake during meals and make recommendations for diet changes as appropriate    Patient report:  No difficulty swallowing, able to masticate all items without lower dentition    Diet prior to admit:  Regular per pt                  PROCEDURE     Consistencies Administered During the Evaluation   Liquids: thin liquid   Solids:  pureed foods and solid foods      Method of Intake:   cup, straw, spoon  Self fed, Fed by clinician      Position:   Seated, upright                    RESULTS     Oral Stage:         Extended mastication due to:  poor/missing dentition   and Oral residuals were noted :  throughout the oral cavity; pt reports this is not new and that he will choose items he knows he is able to masticate      Pharyngeal Stage:      No signs of aspiration were noted during this evaluation however, silent aspiration cannot be ruled out at bedside. If silent aspiration is suspected, a Videofluoroscopic Study of Swallowing (MBS) is recommended and requires a physician order. The Speech Language Pathologist (SLP) completed education with the patient regarding results of evaluation.  Prognosis for improvements is good, This plan will be re-evaluated and revised in 1 week  if warranted. , Patient stated goals: Agreed with above, Treatment goals discussed with Patient, The Patient understand the diagnosis, prognosis and plan of care. INTERVENTION/EDUCATION    Pt educated on above results and POC. Pt trained on general comp strats for safe swallow and encouraged to use as needed. Pt educated on s/s of aspiration and encouraged to notify nursing if observed. Questions answered. [x]The admitting diagnosis and active problem list, as listed below have been reviewed prior to initiation of this evaluation. ADMITTING DIAGNOSIS: Pneumonia [J18.9]  Pneumonia [J18.9]     ACTIVE PROBLEM LIST:   Patient Active Problem List   Diagnosis    Radiculopathy, lumbar region    Essential hypertension    Atherosclerosis of native coronary artery of native heart without angina pectoris    COPD (ch[facility] obstructive [facility] disease) [facility]    Hypothyroidism    Spinal stenosis    [facility]congestive [facility] failure) ([facility]    Presence of drug coated stent in left circumflex coronary artery    Obesity (BMI 30.0-34. 9)    Carotid bruit    Swelling of lower limb    Lymphedema of lower extremity    PVD (peripheral vascular disease) with claudication ([facility])    Onychomycosis    Tobacco dependence    S/P carotid endarterectomy    Femoral artery aneurysm, left ([facility])    History of angioplasty of peripheral vessel    Ischemic cardiomyopathy    Pure hypercholesterolemia    Femoral artery pseudo-aneurysm, left ([facility])    Pneumonia

## 2019-08-05 NOTE — PROGRESS NOTES
[facility] Quality Flow/Interdisciplinary Rounds Progress Note        Quality Flow Rounds held on August 5, 2019    Disciplines Attending:  Bedside Nurse, ,  and Nursing Unit Leadership    Dyana Chase was admitted on 8/4/2019  2:17 PM    Anticipated Discharge Date:  Expected Discharge Date: 08/07/19    Disposition:    Yang Score:  Yang Scale Score: 20    Readmission Risk              Risk of Unplanned Readmission:        14           Discussed patient goal for the day, patient clinical progression, and barriers to discharge.   The following Goal(s) of the Day/Commitment(s) have been identified:  IV antibiotics, check consult plan      Brigette Camargo  August 5, 2019

## 2019-08-05 NOTE — PROGRESS NOTES
Spoke to Dr. Austin Memory to clarify coreg parameters, and patient request for benadryl.   Nancie Vee RN

## 2019-08-05 NOTE — PROGRESS NOTES
Occupational Therapy  OCCUPATIONAL THERAPY INITIAL EVALUATION      Date:2019  Patient Name: Cong Richey  MRN: [id number]  : 1940  Room: [address] [place], [place] [address]    Evaluating OT: Yajaira Mendoza OTR/L [id number]    AM-PAC Daily Activity Raw Score:     Recommended Adaptive Equipment: TBD    Diagnosis: pneumonia. Pt presents to ED with dry cough. Pertinent Medical History: CAD, CHF, COPD, HTN, PVD   Precautions:  Falls, O2     Home Living: Pt lives with wife in a 2 story home with B/B 2nd floor, no 1/2 bath, a few steps on entry. Bathroom setup: tub/shower combo     Prior Level of Function: Independent with ADLs, Independent with IADLs; completed functional mobility no AD, has ww and cane if needed. Pain Level: \"lung\" pain reported    Cognition: A&O: . Problem solving:  WFL   Judgement/safety:  WFL     Functional Assessment:   Initial Eval Status  Date: 19 Treatment session:  Short Term Goals  Treatment frequency: 2-5x/wk PRN x1-3 wks   Feeding Independent     Grooming Set up  Independent   UB Dressing Min A  [facility] gown  Independent   LB Dressing Min A  Mod I    Bathing Mod A  Sponge bathing gardenia area and trunk   Mod I   Toileting Min A  Use of HH urinal while supine in bed.  Pt reports unable to get to bathroom for toileting or it would be \"an hour trip\" despite max encouragement  Mod I   Bed Mobility  Supine to sit: SBA     Functional Transfers STS: SBA  Mod I   Functional Mobility SBA with [employer]  Hallway distance  Mod I during ADLs   Balance Sitting: fair    Standing: fair at [employer]     Activity Tolerance Fair minus  standing bradley x6-7 min with fair plus balance during self care tasks              Strength ROM Additional Info:    RUE  4-/5 WFL good  and FMC/dexterity noted during ADL tasks     LUE 4-/5 WFL good  and FMC/dexterity noted during ADL tasks         Hearing: Torres Martinez  Vision: WFL   Sensation:  No c/o numbness or tingling   Tone: WFL   Edema: none thorough review of current medical information, gathering information on past medical history/social history and prior level of function, completion of standardized testing/informal observation of tasks, assessment of data, and development of POC/Goals    Merlyn Win OTR/L  [id number]

## 2019-08-05 NOTE — CONSULTS
(IMODIUM) 2 MG capsule Take 2 mg by mouth 4 times daily as needed for Diarrhea   Yes Historical Provider, MD   atorvastatin (LIPITOR) 80 MG tablet Take 1 tablet by mouth nightly  Patient taking differently: Take 80 mg by mouth daily  9/28/15  Yes Xiomara River MD   diphenhydrAMINE (BENADRYL) 25 MG capsule Take 50 mg by mouth nightly as needed for Sleep    Yes Historical Provider, MD   aspirin 325 MG tablet Take 162.5 mg by mouth daily    Yes Historical Provider, MD   lisinopril (PRINIVIL;ZESTRIL) 10 MG tablet Take 1 tablet by mouth daily. 6/14/14  Yes Harshal Fenton MD   carvedilol (COREG) 12.5 MG tablet Take 1 tablet by mouth 2 times daily. 6/14/14  Yes Harshal Fenton MD   furosemide (LASIX) 40 MG tablet Take 1 tablet by mouth three times a week. Patient taking differently: Take 40 mg by mouth Five times weekly  6/16/14  Yes Xiomara River MD   gabapentin (NEURONTIN) 600 MG tablet Take 600 mg by mouth 3 times daily. Yes Historical Provider, MD   fluocinonide (LIDEX) 0.05 % cream Apply topically daily    Yes Historical Provider, MD   levothyroxine (SYNTHROID) 50 MCG tablet Take 50 mcg by mouth daily.      Yes Historical Provider, MD   Neomycin-Polymyxin-HC (CORTISPORIN OT) Place 2 drops in ear(s) 2 times daily 1 % both ears    Historical Provider, MD       Current Medications:    Current Facility-Administered Medications: potassium chloride (KLOR-CON M) extended release tablet 40 mEq, 40 mEq, Oral, Once  aspirin tablet 162.5 mg, 162.5 mg, Oral, Daily  atorvastatin (LIPITOR) tablet 80 mg, 80 mg, Oral, Nightly  baclofen (LIORESAL) tablet 10 mg, 10 mg, Oral, BID  carvedilol (COREG) tablet 12.5 mg, 12.5 mg, Oral, BID  cholestyramine (QUESTRAN) packet 4 g, 4 g, Oral, BID  cilostazol (PLETAL) tablet 25 mg, 25 mg, Oral, BID  clopidogrel (PLAVIX) tablet 75 mg, 75 mg, Oral, Daily  escitalopram (LEXAPRO) tablet 10 mg, 10 mg, Oral, Daily  gabapentin (NEURONTIN) capsule 600 mg, 600 mg, Oral, TID  furosemide (LASIX)

## 2019-08-05 NOTE — PROGRESS NOTES
Dr. Bravo Benavides and Dr. aLxmi Barnes discussed case on unit. Dr. Laxmi Barnes aware of new consult information.

## 2019-08-05 NOTE — PLAN OF CARE
Problem: Falls - Risk of:  Goal: Will remain free from falls  Description  Will remain free from falls  Outcome: Met This Shift  Goal: Absence of physical injury  Description  Absence of physical injury  Outcome: Met This Shift     Problem: Pain:  Description  Pain management should include both nonpharmacologic and pharmacologic interventions.   Goal: Pain level will decrease  Description  Pain level will decrease  Outcome: Met This Shift  Goal: Control of acute pain  Description  Control of acute pain  Outcome: Met This Shift  Goal: Control of chronic pain  Description  Control of chronic pain  Outcome: Met This Shift

## 2019-08-06 NOTE — PROGRESS NOTES
aorta not enlarged. Femoral arteries without bruits. Pedal pulses 1+. 2+ bilateral ankle edema. ABDOMEN: Soft without hepatic or splenic enlargement. No tenderness. MUSCULOSKELETAL: No kyphosis or scoliosis of the back. Good muscle strength and tone. No muscle atrophy. EXTREMITIES: No clubbing or cyanosis. SKIN: No Xanthomas or ulcerations. NEUROLOGIC: Oriented to time, place and person. Normal mood and affect. LYMPHATIC:  No palpable neck or supraclavicular nodes. Current Inpatient Medications:   furosemide  40 mg Intravenous Daily    atorvastatin  80 mg Oral Nightly    baclofen  10 mg Oral BID    carvedilol  12.5 mg Oral BID    cholestyramine  4 g Oral BID    cilostazol  25 mg Oral BID    clopidogrel  75 mg Oral Daily    escitalopram  10 mg Oral Daily    gabapentin  600 mg Oral TID    levothyroxine  50 mcg Oral Daily    lisinopril  10 mg Oral Daily    sodium chloride flush  10 mL Intravenous 2 times per day    ipratropium-albuterol  1 ampule Inhalation Q4H WA    piperacillin-tazobactam  3.375 g Intravenous Q8H    doxycycline (VIBRAMYCIN) IV  100 mg Intravenous Q12H       IV Infusions (if any):      LABS:    CBC:   Recent Labs     08/04/19  1443 08/05/19  0640   WBC 8.5 8.4   HGB 14.1 12.5   HCT 44.1 39.0    135     BMP:   Recent Labs     08/05/19  0640 08/06/19  0335    140   K 3.7 4.0   CO2 34* 32*   BUN 15 13   CREATININE 0.9 0.8   LABGLOM >60 >60   GLUCOSE 112* 96     BNP: No results for input(s): BNP in the last 72 hours. PT/INR:   Recent Labs     08/04/19  1443   PROTIME 14.2*   INR 1.3     Mag:   Recent Labs     08/05/19  0640 08/06/19  0335   MG 1.9 2.0     Phos: No results for input(s): PHOS in the last 72 hours. TSH: No results for input(s): TSH in the last 72 hours.   HgA1c:   Lab Results   Component Value Date    LABA1C 5.7 06/09/2014     No results found for: EAG  Cardiac Enzymes:   Recent Labs     08/04/19  1443 08/05/19  0640   TROPONINI

## 2019-08-06 NOTE — PLAN OF CARE
Problem: Falls - Risk of:  Goal: Will remain free from falls  Description  Will remain free from falls  Outcome: Met This Shift  Goal: Absence of physical injury  Description  Absence of physical injury  Outcome: Met This Shift     Problem: Pain:  Goal: Pain level will decrease  Description  Pain level will decrease  Outcome: Not Met This Shift  Goal: Control of acute pain  Description  Control of acute pain  Outcome: Not Met This Shift  Goal: Control of chronic pain  Description  Control of chronic pain  Outcome: Not Met This Shift

## 2019-08-06 NOTE — PLAN OF CARE
Problem: Falls - Risk of:  Goal: Will remain free from falls  Description  Will remain free from falls  8/6/2019 0114 by Belvie Schwab, RN  Outcome: Met This Shift  8/5/2019 1634 by Caren Miranda RN  Outcome: Met This Shift  Goal: Absence of physical injury  Description  Absence of physical injury  8/6/2019 0114 by Belvie Schwab, RN  Outcome: Met This Shift  8/5/2019 1634 by Caren Miranda RN  Outcome: Met This Shift     Problem: Pain:  Goal: Pain level will decrease  Description  Pain level will decrease  8/6/2019 0114 by Belvie Schwab, RN  Outcome: Ongoing  8/5/2019 1634 by Caren Miranda RN  Outcome: Met This Shift  Goal: Control of acute pain  Description  Control of acute pain  8/5/2019 1634 by Caren Miranda RN  Outcome: Met This Shift  Goal: Control of chronic pain  Description  Control of chronic pain  8/5/2019 1634 by Caren Miranda RN  Outcome: Met This Shift

## 2019-08-06 NOTE — PROGRESS NOTES
Department of Internal Maggie Hampton M.D.  Progress Note      SUBJECTIVE:  Slept better and ate better    OBJECTIVE  abd soft     Medications    Current Facility-Administered Medications: furosemide (LASIX) injection 40 mg, 40 mg, Intravenous, Daily  perflutren lipid microspheres (DEFINITY) injection 1.65 mg, 1.5 mL, Intravenous, ONCE PRN  oxyCODONE-acetaminophen (PERCOCET) 5-325 MG per tablet 1 tablet, 1 tablet, Oral, Q4H PRN  atorvastatin (LIPITOR) tablet 80 mg, 80 mg, Oral, Nightly  baclofen (LIORESAL) tablet 10 mg, 10 mg, Oral, BID  carvedilol (COREG) tablet 12.5 mg, 12.5 mg, Oral, BID  cholestyramine (QUESTRAN) packet 4 g, 4 g, Oral, BID  cilostazol (PLETAL) tablet 25 mg, 25 mg, Oral, BID  clopidogrel (PLAVIX) tablet 75 mg, 75 mg, Oral, Daily  escitalopram (LEXAPRO) tablet 10 mg, 10 mg, Oral, Daily  gabapentin (NEURONTIN) capsule 600 mg, 600 mg, Oral, TID  levothyroxine (SYNTHROID) tablet 50 mcg, 50 mcg, Oral, Daily  lisinopril (PRINIVIL;ZESTRIL) tablet 10 mg, 10 mg, Oral, Daily  loperamide (IMODIUM) capsule 2 mg, 2 mg, Oral, 4x Daily PRN  sodium chloride flush 0.9 % injection 10 mL, 10 mL, Intravenous, 2 times per day  sodium chloride flush 0.9 % injection 10 mL, 10 mL, Intravenous, PRN  magnesium hydroxide (MILK OF MAGNESIA) 400 MG/5ML suspension 30 mL, 30 mL, Oral, Daily PRN  ondansetron (ZOFRAN) injection 4 mg, 4 mg, Intravenous, Q6H PRN  ipratropium-albuterol (DUONEB) nebulizer solution 1 ampule, 1 ampule, Inhalation, Q4H WA  acetaminophen (TYLENOL) tablet 650 mg, 650 mg, Oral, Q4H PRN  piperacillin-tazobactam (ZOSYN) 3.375 g in dextrose 5 % 50 mL IVPB extended infusion (mini-bag), 3.375 g, Intravenous, Q8H  doxycycline (VIBRAMYCIN) 100 mg in dextrose 5 % 100 mL IVPB, 100 mg, Intravenous, Q12H  diphenhydrAMINE (BENADRYL) tablet 50 mg, 50 mg, Oral, Nightly PRN  Physical    VITALS:  BP (!) 108/48   Pulse 53   Temp 97.7 °F (36.5 °C) (Oral)   Resp 18   Ht 5' 7\" (1.702 m)

## 2019-08-06 NOTE — OP NOTE
Thoracentesis    Paz Gonzalez 70 y.o. male [id number] 8/6/2019    Indication: Pleural Effusion    Performed by:  Harrison Ink    Consent:   Verbal consent obtained. Written consent obtained. Risks and benefits: risks, benefits and alternatives were discussed including bleeding and pneumothorax    Consent given by:   the patient  Patient understanding: patient states understanding of the procedure being performed  Time out: Immediately prior to procedure a \"time out\" was called to verify the correct  Patient was made up in sitting position and ultrasound was done and site of maximum fluid collection was marked on left  Preparation: Patient was prepped and draped in the usual sterile fashion. Local anesthesia used: yes  Local anesthetic: lidocaine 2% without epinephrine  Once there was free fluid return needle was changed with Arrow's thoracentesis tray's soft catheter and close to 2000cc of turbid fluid drained from the on left pleural space. Patient tolerated the procedure well    Complications:   None apparent  Chest Xray post procedure ordered to rule out pneumothorax. Following Labs Sent:  Red Top: LDH, Glucose, total Protein, albumin, Cholesterol, CEA and Triglycerides  Purple Top: Cell count and Diff  Cup #1: Gram Stain and Cultures and Sensitivity, AFB smear and Cultures, and KOH stain and Fungal cultures. Cup #2 : Cytology    Dulcie Michelle. Dena Mahoney M.D., F.C.C.P.       [facility], [address] [place] [address], [address] [place] [address]

## 2019-08-07 NOTE — PROGRESS NOTES
Resp 18   Ht 5' 7\" (1.702 m)   Wt 168 lb 9.6 oz (76.5 kg)   SpO2 94%   BMI 26.41 kg/m²   24HR INTAKE/OUTPUT:      Intake/Output Summary (Last 24 hours) at 8/7/2019 0834  Last data filed at 8/7/2019 0701  Gross per 24 hour   Intake 890 ml   Output 1090 ml   Net -200 ml     LUNGS:  Dec breath sounds all over  CARDIOVASCULAR:  s1 [id number]  Data    CBC with Differential:  Lab Results   Component Value Date    WBC 8.4 08/05/2019    RBC 3.75 08/05/2019    HGB 12.5 08/05/2019    HCT 39.0 08/05/2019     08/05/2019    .0 08/05/2019    MCH 33.3 08/05/2019    MCHC 32.1 08/05/2019    RDW 14.2 08/05/2019    SEGSPCT 58 03/29/2012    LYMPHOPCT 10.0 08/04/2019    MONOPCT 4.0 08/04/2019    BASOPCT 0.7 08/04/2019    MONOSABS 0.34 08/04/2019    LYMPHSABS 0.85 08/04/2019    EOSABS 0.64 08/04/2019    BASOSABS 0.06 08/04/2019     CMP:  Lab Results   Component Value Date     08/06/2019    K 4.0 08/06/2019    K 3.7 08/05/2019     08/06/2019    CO2 32 08/06/2019    BUN 13 08/06/2019    CREATININE 0.8 08/06/2019    GFRAA >60 08/06/2019    LABGLOM >60 08/06/2019    GLUCOSE 96 08/06/2019    GLUCOSE 81 03/29/2012    PROT 6.9 10/18/2016    LABALBU 3.8 10/18/2016    CALCIUM 8.1 08/06/2019    BILITOT 0.3 10/18/2016    ALKPHOS 87 10/18/2016    AST 19 10/18/2016    ALT 16 10/18/2016     PT/INR:    Lab Results   Component Value Date    PROTIME 14.2 08/04/2019    PROTIME 12.3 03/29/2012    INR 1.3 08/04/2019       ASSESSMENT AND PLAN      Patient Active Hospital Problem List:   Pneumonia (8/4/2019)    Assessment: stable    Plan: antibiotics   LV dysfunction ()    Assessment: stable    Plan: same  interstial lung disease severe;;cavitary lung lesion   Try biopsy             Electronically signed by Jay Lewis MD on 8/7/2019 at 8:34 AM

## 2019-08-07 NOTE — PLAN OF CARE
Problem: Falls - Risk of:  Goal: Will remain free from falls  Description  Will remain free from falls  8/7/2019 0127 by Anton Hernández RN  Outcome: Met This Shift  8/6/2019 1738 by Aurea Pike RN  Outcome: Met This Shift  Goal: Absence of physical injury  Description  Absence of physical injury  8/7/2019 0127 by Anton Hernández RN  Outcome: Met This Shift  8/6/2019 1738 by Aurea Pike RN  Outcome: Met This Shift     Problem: Pain:  Goal: Pain level will decrease  Description  Pain level will decrease  8/7/2019 0127 by Anton Hernández RN  Outcome: Met This Shift  8/6/2019 1738 by Aurea Pike RN  Outcome: Not Met This Shift  Goal: Control of acute pain  Description  Control of acute pain  8/6/2019 1738 by Aurea Pike RN  Outcome: Not Met This Shift  Goal: Control of chronic pain  Description  Control of chronic pain  8/6/2019 1738 by Aurea Pike RN  Outcome: Not Met This Shift

## 2019-08-07 NOTE — PROGRESS NOTES
clopidogrel  75 mg Oral Daily    escitalopram  10 mg Oral Daily    gabapentin  600 mg Oral TID    levothyroxine  50 mcg Oral Daily    lisinopril  10 mg Oral Daily    sodium chloride flush  10 mL Intravenous 2 times per day    ipratropium-albuterol  1 ampule Inhalation Q4H WA    piperacillin-tazobactam  3.375 g Intravenous Q8H    doxycycline (VIBRAMYCIN) IV  100 mg Intravenous Q12H       Diet:   DIET LOW SODIUM 2 GM;     EXAM:  General: No distress. Alert. Eyes: PERRL. No sclera icterus. No conjunctival injection. ENT: No discharge. Pharynx clear. Neck: Trachea midline. Normal thyroid. Resp: No accessory muscle use. no rales. no wheezing. no rhonchi. Decreased at the right base  CV: Regular rate. Regular rhythm. No murmur or rub. Abd: Non-tender. Non-distended. No masses. No organomegaly. Normal bowel sounds. Skin: Warm and dry. No nodule on exposed extremities. No rash on exposed extremities. Ext: No cyanosis, clubbing, edema  Lymph: No cervical LAD. No supraclavicular LAD. M/S: No cyanosis. No joint deformity. No clubbing. Neuro: Awake. Follows commands. Positive pupils/gag/corneals. Normal pain response. Results:  CBC:   Recent Labs     08/05/19  0640   WBC 8.4   HGB 12.5   HCT 39.0   .0*        BMP:   Recent Labs     08/05/19  0640 08/06/19  0335    140   K 3.7 4.0    100   CO2 34* 32*   BUN 15 13   CREATININE 0.9 0.8     LIVER PROFILE: No results for input(s): AST, ALT, LIPASE, BILIDIR, BILITOT, ALKPHOS in the last 72 hours. Invalid input(s): AMYLASE,  ALB  PT/INR: No results for input(s): PROTIME, INR in the last 72 hours. APTT: No results for input(s): APTT in the last 72 hours. Pathology:  1. N/A      Microbiology:  1. None    Recent ABG:   No results for input(s): PH, PO2, PCO2, HCO3, BE, O2SAT, METHB, O2HB, COHB, O2CON, HHB, THB in the last 72 hours.           Recent Films:  XR CHEST PORTABLE   Final Result   Stable CHF with  decreased left errors. Mauricio Kwon M.D., FLuchoCLuchoCLARA     [facility], [address] [facility], [address] [facility]

## 2019-08-08 PROBLEM — E44.0 MODERATE PROTEIN-CALORIE MALNUTRITION (HCC): Status: ACTIVE | Noted: 2019-01-01

## 2019-08-08 NOTE — PROGRESS NOTES
Oriented to person, place and time. Speech clear and appropriate. Follows all commands. Pleasant affect       Intake/Output Summary (Last 24 hours) at 8/8/2019 1012  Last data filed at 8/7/2019 2132  Gross per 24 hour   Intake 550 ml   Output 290 ml   Net 260 ml       Weight:   Wt Readings from Last 3 Encounters:   08/08/19 166 lb (75.3 kg)   12/13/18 195 lb (88.5 kg)   10/25/16 202 lb (91.6 kg)     Current Inpatient Medications:   furosemide  40 mg Intravenous Daily    atorvastatin  80 mg Oral Nightly    baclofen  10 mg Oral BID    carvedilol  12.5 mg Oral BID    cholestyramine  4 g Oral BID    cilostazol  25 mg Oral BID    clopidogrel  75 mg Oral Daily    escitalopram  10 mg Oral Daily    gabapentin  600 mg Oral TID    levothyroxine  50 mcg Oral Daily    lisinopril  10 mg Oral Daily    sodium chloride flush  10 mL Intravenous 2 times per day    ipratropium-albuterol  1 ampule Inhalation Q4H WA    piperacillin-tazobactam  3.375 g Intravenous Q8H    doxycycline (VIBRAMYCIN) IV  100 mg Intravenous Q12H       DIAGNOSTIC/ LABORATORY DATA:  Labs:     Recent Labs     08/06/19  0335      K 4.0   CO2 32*   BUN 13   CREATININE 0.8   LABGLOM >60   CALCIUM 8.1*     Mag:   Recent Labs     08/06/19  0335   MG 2.0       08/05/2019 Echocardiogram:   Left ventricular size is grossly normal.  Mild left ventricular concentric hypertrophy noted. Ejection fraction is visually estimated at 45%. No regional wall motion abnormalities seen. There is doppler evidence of stage II diastolic dysfunction. Moderate mitral regurgitation is present. Mild tricuspid regurgitation. RVSP is 71 mmHg. 08/06/2019 CXR:   Stable CHF with  decreased left pleural effusion. There is no pneumothorax. 08/06/2019 Ultrasound for Thoracentesis:  Moderate left pleural effusion      Telemetry: SB with HR 40s-50s; Currently SR with HR 61  12 lead EKG: NA      ASSESSMENT:   1.  Noncardiac chest pain with no acute EKG changes and 2

## 2019-08-08 NOTE — DISCHARGE INSTR - COC
Falls    Impairments/Disabilities:      Hearing    Nutrition Therapy:  Current Nutrition Therapy:   - Oral Diet:  Low Sodium (2gm)    Routes of Feeding: Oral  Liquids: No Restrictions  Daily Fluid Restriction: no  Last Modified Barium Swallow with Video (Video Swallowing Test): not done    Treatments at the Time of Hospital Discharge:   Respiratory Treatments:   Oxygen Therapy:  is on oxygen at 2 L/min per nasal cannula. Ventilator:    - No ventilator support    Rehab Therapies: Physical Therapy and Occupational Therapy  Weight Bearing Status/Restrictions: No weight bearing restirctions  Other Medical Equipment (for information only, NOT a DME order):  walker  Other Treatments:     Patient's personal belongings (please select all that are sent with patient):  Dentures upper and lower    RN SIGNATURE:  Electronically signed by Cortney Gonzales RN on 8/9/19 at 3:46 PM    CASE MANAGEMENT/SOCIAL WORK SECTION    Inpatient Status Date: ***    Readmission Risk Assessment Score:  Readmission Risk              Risk of Unplanned Readmission:        14           Discharging to Facility/ Agency   · Name: [facility]  · Address:[address] [place] [address]  · MECTB:[phone]  · Fax:[phone]    Dialysis Facility (if applicable)   · Name:  · Address:  · Dialysis Schedule:  · Phone:  · Fax:    / signature: Electronically signed by CARLINE Guerrero on 8/8/2019 at 11:47 AM      PHYSICIAN SECTION    Prognosis: {Prognosis:1537174213}    Condition at Discharge: [address] Patient Condition:[id number]}    Rehab Potential (if transferring to Rehab): {Prognosis:7545024966}    Recommended Labs or Other Treatments After Discharge: ***    Physician Certification: I certify the above information and transfer of Jose Zhao  is necessary for the continuing treatment of the diagnosis listed and that he requires skilled snf for {LESS:019192806} 30 days.      Update Admission H&P: {CHP DME Changes in MYMKK:304327320}    PHYSICIAN SIGNATURE:   Electronically signed by Paige Ordaz MD on 8/9/2019 at 3:35 PM

## 2019-08-08 NOTE — PROGRESS NOTES
Pulmonary Progress Note    Admit Date: 2019  Hospital day                               PCP: Ban Meza MD    Chief Complaint (s):  Patient Active Problem List   Diagnosis    Radiculopathy, lumbar region    Essential hypertension    Atherosclerosis of native coronary artery of native heart without angina pectoris    [facility] obstructive [facility]) [facility]    Hypothyroidism    Spinal stenosis    CHF (congestive heart failure) ([facility]    Presence of drug coated stent in left circumflex coronary artery    Obesity (BMI 30.0-34. 9)    Carotid bruit    Swelling of lower limb    Lymphedema of lower extremity    PVD [facility]peripheral vascular disease) [facility] claudication [facility]    Onychomycosis    Tobacco dependence    S/P carotid endarterectomy    Femoral artery aneurysm, left ([facility])    History of angioplasty of peripheral vessel    Ischemic cardiomyopathy    Pure hypercholesterolemia    Femoral artery pseudo-aneurysm, left ([facility])    Pneumonia    Chest pain    Acute diastolic CHF (congestive heart failure) ([facility])    Edema    LV dysfunction    Moderate protein-calorie malnutrition ([facility])       Subjective:  · Biopsy results pending, procedure was reviewed with the patient.       Vitals:  VITALS:  /64   Pulse 58   Temp 98.9 °F (37.2 °C)   Resp 20   Ht 5' 7\" (1.702 m)   Wt 166 lb (75.3 kg)   SpO2 94%   BMI 26.00 kg/m²     24HR INTAKE/OUTPUT:      Intake/Output Summary (Last 24 hours) at 2019 1736  Last data filed at 2019 1627  Gross per 24 hour   Intake 560 ml   Output 375 ml   Net 185 ml       24HR PULSE OXIMETRY RANGE:    SpO2  Av.9 %  Min: 90 %  Max: 100 %    Medications:  IV:      Scheduled Meds:   furosemide  40 mg Intravenous Daily    atorvastatin  80 mg Oral Nightly    baclofen  10 mg Oral BID    carvedilol  12.5 mg Oral BID    cholestyramine  4 g Oral BID    cilostazol  25 mg Oral BID    clopidogrel  75 mg Oral Daily    escitalopram 10 mg Oral Daily    gabapentin  600 mg Oral TID    levothyroxine  50 mcg Oral Daily    lisinopril  10 mg Oral Daily    sodium chloride flush  10 mL Intravenous 2 times per day    ipratropium-albuterol  1 ampule Inhalation Q4H WA    piperacillin-tazobactam  3.375 g Intravenous Q8H    doxycycline (VIBRAMYCIN) IV  100 mg Intravenous Q12H       Diet:   DIET LOW SODIUM 2 GM;  Dietary Nutrition Supplements: Clear Liquid Oral Supplement     EXAM:  General: No distress. Alert. Eyes: PERRL. No sclera icterus. No conjunctival injection. ENT: No discharge. Pharynx clear. Neck: Trachea midline. Normal thyroid. Resp: No accessory muscle use. no rales. no wheezing. no rhonchi. CV: Regular rate. Regular rhythm. No murmur or rub. Abd: Non-tender. Non-distended. No masses. No organomegaly. Normal bowel sounds. Skin: Warm and dry. No nodule on exposed extremities. No rash on exposed extremities. Ext: No cyanosis, clubbing, edema  Lymph: No cervical LAD. No supraclavicular LAD. M/S: No cyanosis. No joint deformity. No clubbing. Neuro: Awake. Follows commands. Positive pupils/gag/corneals. Normal pain response. Results:  CBC:   No results for input(s): WBC, HGB, HCT, MCV, PLT in the last 72 hours. BMP:   Recent Labs     08/06/19  0335 08/08/19  1030    140   K 4.0 3.8    93*   CO2 32* 41*   BUN 13 15   CREATININE 0.8 0.9     LIVER PROFILE: No results for input(s): AST, ALT, LIPASE, BILIDIR, BILITOT, ALKPHOS in the last 72 hours. Invalid input(s): AMYLASE,  ALB  PT/INR: No results for input(s): PROTIME, INR in the last 72 hours. APTT: No results for input(s): APTT in the last 72 hours. Pathology:  1. N/A      Microbiology:  1. None    Recent ABG:   No results for input(s): PH, PO2, PCO2, HCO3, BE, O2SAT, METHB, O2HB, COHB, O2CON, HHB, THB in the last 72 hours. Recent Films:  XR CHEST PORTABLE   Final Result   As above.                   CT NEEDLE BIOPSY LUNG/MEDIASTINUM

## 2019-08-09 NOTE — PLAN OF CARE
Problem: Falls - Risk of:  Goal: Will remain free from falls  Description  Will remain free from falls  Outcome: Met This Shift     Problem: Pain:  Goal: Pain level will decrease  Description  Pain level will decrease  Outcome: Met This Shift     Problem: Malnutrition  (NI-5.2)  Goal: Food and/or Nutrient Delivery  Description  Individualized approach for food/nutrient provision. 8/8/2019 1605 by Trish Ford RD, CNSC, LD  Outcome: Met This Shift     Problem: Risk for Impaired Skin Integrity  Goal: Tissue integrity - skin and mucous membranes  Description  Structural intactness and normal physiological function of skin and  mucous membranes.   Outcome: Met This Shift

## 2019-08-09 NOTE — PROGRESS NOTES
(Axillary)   Resp 16   Ht 5' 7\" (1.702 m)   Wt 162 lb 14.4 oz (73.9 kg)   SpO2 95%   BMI 25.51 kg/m²   24HR INTAKE/OUTPUT:      Intake/Output Summary (Last 24 hours) at 8/9/2019 0849  Last data filed at 8/9/2019 0810  Gross per 24 hour   Intake 410 ml   Output 1090 ml   Net -680 ml     LUNGS:  Poor breath sounds in bases slight wheeze  CARDIOVASCULAR:  s1 s2   Data    CBC with Differential:  Lab Results   Component Value Date    WBC 8.4 08/05/2019    RBC 3.75 08/05/2019    HGB 12.5 08/05/2019    HCT 39.0 08/05/2019     08/05/2019    .0 08/05/2019    MCH 33.3 08/05/2019    MCHC 32.1 08/05/2019    RDW 14.2 08/05/2019    SEGSPCT 58 03/29/2012    LYMPHOPCT 10.0 08/04/2019    MONOPCT 4.0 08/04/2019    BASOPCT 0.7 08/04/2019    MONOSABS 0.34 08/04/2019    LYMPHSABS 0.85 08/04/2019    EOSABS 0.64 08/04/2019    BASOSABS 0.06 08/04/2019     CMP:  Lab Results   Component Value Date     08/08/2019    K 3.8 08/08/2019    K 3.7 08/05/2019    CL 93 08/08/2019    CO2 41 08/08/2019    BUN 15 08/08/2019    CREATININE 0.9 08/08/2019    GFRAA >60 08/08/2019    LABGLOM >60 08/08/2019    GLUCOSE 96 08/08/2019    GLUCOSE 81 03/29/2012    PROT 6.9 10/18/2016    LABALBU 3.8 10/18/2016    CALCIUM 8.3 08/08/2019    BILITOT 0.3 10/18/2016    ALKPHOS 87 10/18/2016    AST 19 10/18/2016    ALT 16 10/18/2016     PT/INR:    Lab Results   Component Value Date    PROTIME 14.2 08/04/2019    PROTIME 12.3 03/29/2012    INR 1.3 08/04/2019       ASSESSMENT AND PLAN      Patient Active Hospital Problem List:      pleural effusion=better   Moderate protein-calorie malnutrition ([address] (8/8/2019)    Assessment: better    Plan: diet    Respiratory failure=oxygen  interstial lung disease      Electronically signed by Cyndy Malik MD on 8/9/2019 at 8:49 AM

## 2019-08-09 NOTE — PROGRESS NOTES
Inpatient Cardiology Progress note     PATIENT IS BEING FOLLOWED FOR: Noncardiac chest pain, HFrEF     Korey Zamora is a 70year old  male who followed with Dr. Rene Al. He was most recently seen in consultation with Dr. [facility] on 08/05/2019. SUBJECTIVE:He continues to report that his breathing in unchanged   No events overnight       OBJECTIVE: No apparent distress     ROS:  Consist: Denies fevers, chills or night sweats  Heart: Denies, palpitations, lightheadedness, dizziness or syncope  Lungs: Denies wheezing, orthopnea or PND  GI: Denies abdominal pain, vomiting or diarrhea    PHYSICAL EXAM:   BP 90/60   Pulse 62   Temp 97.1 °F (36.2 °C) (Axillary)   Resp 16   Ht 5' 7\" (1.702 m)   Wt 162 lb 14.4 oz (73.9 kg)   SpO2 95%   BMI 25.51 kg/m²    CONST: Well developed, well nourished elderly male who appears of his stated age. Awake, alert and cooperative. No apparent distress  CHEST: Chest symmetrical and non-tender to palpation. No accessory muscle use or intercostal retractions  RESPIRATORY:  Lung sounds - diminished right lung fields. CARDIOVASCULAR:     Heart Ausculation- Regular rhythm, bradycardic, systolic murmur. No s3, s4 or rub   PV: no bilateral lower extremity edema. Noted varicosities. Feet appear to be well perfused , no clubbing or cyanosis   ABDOMEN: Soft, non-tender to light palpation. Bowel sounds present. MS: Good muscle strength and tone. No atrophy or abnormal movements. : Deferred  SKIN: Warm and dry no statis dermatitis or ulcers   NEURO / PSYCH: Oriented to person, place and time. Speech clear and appropriate. Follows all commands.  Pleasant affect       Intake/Output Summary (Last 24 hours) at 8/9/2019 1147  Last data filed at 8/9/2019 1000  Gross per 24 hour   Intake 430 ml   Output 790 ml   Net -360 ml       Weight:   Wt Readings from Last 3 Encounters:   08/09/19 162 lb 14.4 oz (73.9 kg)   12/13/18 195 lb (88.5 kg)   10/25/16 202 lb (91.6 kg)     Current Inpatient Medications:   bumetanide  0.5 mg Oral Daily    atorvastatin  80 mg Oral Nightly    baclofen  10 mg Oral BID    carvedilol  12.5 mg Oral BID    cholestyramine  4 g Oral BID    cilostazol  25 mg Oral BID    clopidogrel  75 mg Oral Daily    escitalopram  10 mg Oral Daily    gabapentin  600 mg Oral TID    levothyroxine  50 mcg Oral Daily    lisinopril  10 mg Oral Daily    sodium chloride flush  10 mL Intravenous 2 times per day    ipratropium-albuterol  1 ampule Inhalation Q4H WA       DIAGNOSTIC/ LABORATORY DATA:  Labs:     Recent Labs     08/08/19  1030      K 3.8   CO2 41*   BUN 15   CREATININE 0.9   LABGLOM >60   CALCIUM 8.3*     Mag:   No results for input(s): MG in the last 72 hours. 08/05/2019 Echocardiogram:   Left ventricular size is grossly normal.  Mild left ventricular concentric hypertrophy noted. Ejection fraction is visually estimated at 45%. No regional wall motion abnormalities seen. There is doppler evidence of stage II diastolic dysfunction. Moderate mitral regurgitation is present. Mild tricuspid regurgitation. RVSP is 71 mmHg. 08/06/2019 CXR:   Stable CHF with  decreased left pleural effusion. There is no pneumothorax. 08/06/2019 Ultrasound for Thoracentesis:  Moderate left pleural effusion      Telemetry: SB with HR 40s-50s; Currently SR with HR 61  12 lead EKG: NA      ASSESSMENT:   1. Noncardiac chest pain with no acute EKG changes and 2 negative troponin   2. Known CAD s/p CABG in 1997. Cardiac catheterization 06/2014 with TONY to the SVG-OM and SVG-RI  3. HFrEF (EF 45% per echo this admission), diuresing well -2.7 liters thus far- now transitioned to oral diuretic   4. Bilateral pleural effusions s/p left sided thoracentesis 08/06/2019 with 2 liters fluid removed  5. Cavitary mass on RUL per CTA of chest 08/04/2019 - biopsy obtained yesterday   6. Moderate MR and Mild TR per echo this admission   7. Sinus Bradycardia, asymptomatic; On Coreg.  TFT normal

## 2019-08-15 NOTE — PROGRESS NOTES
Bowel sound     normoactive   Extremities:  No edema of lower extremities . No cyanosis and moves all extremities   Neuro:  Alert & orient x 3 no focal deficits     REVIEW OF DIAGNOSTIC TESTS:     EKG today normal sinus rhythm  Lab Results   Component Value Date     08/08/2019     08/06/2019     08/05/2019    K 3.8 08/08/2019    K 4.0 08/06/2019    K 3.7 08/05/2019    K 3.9 08/04/2019    K 4.1 10/25/2016    CL 93 08/08/2019     08/06/2019     08/05/2019    CO2 41 08/08/2019    CO2 32 08/06/2019    CO2 34 08/05/2019    BUN 15 08/08/2019    BUN 13 08/06/2019    BUN 15 08/05/2019    CREATININE 0.9 08/08/2019    CREATININE 0.8 08/06/2019    CREATININE 0.9 08/05/2019         Lab Results   Component Value Date    PROBNP 2,547 (H) 08/04/2019    Labs reviewed from August 12, 2019 potassium 3.6 and a BUN 24.70     ASSESSMENT / DIAGNOSIS:      1. Lung cancer  2. Known CAD s/p CABG in 1997. Cardiac catheterization 06/2014 with TONY to the SVG-OM and SVG-RI  3. HFrEF (EF 45% per echo this admission),  he is compensated today medical management. Newly diagnosed cardiomyopathy due to recently diagnosed lung cancer. 4. Bilateral pleural effusions s/p left sided thoracentesis 08/06/2019 with 2 liters fluid removed  5. Cavitary mass on RUL per CTA of chest 08/04/2019 - biopsy obtained yesterday   6. Moderate MR and Mild TR by echo in August 2019   7. Sinus Bradycardia, asymptomatic; On Coreg. TFT normal this admission . 8. HTN: Stable   9. HLD, on statin   10. COPD    6.  recent tobacco abuse/oxygen dependent     12. PVD s/p LLE bypass in remote past and RLE atherectomy 01/2016  13. S/p Bilateral CEAs  14. Hypothyroidism, on replacement therapy  15. Chronic lumbar back pain. CTA of the chest 08/04/2019 with compression fracture at T8 of uncertain chronicity. Stable compression fracture at L1          TREATMENT PLAN:  1. Continue current medications  2. Return to the office in 6 months  3.  Patient is

## 2019-08-16 NOTE — DISCHARGE SUMMARY
[id number] [address]                               DISCHARGE SUMMARY    PATIENT NAME: Fab Matta                    :        1940  MED REC NO:   [id number]                            ROOM:       7907  ACCOUNT NO:   [de-identified]                           ADMIT DATE: 2019  PROVIDER:     Sanford Joe MD                  [facility] DATE: 2019    DATE OF ADMISSION:  2019    DATE OF DISCHARGE:  2019    CHIEF COMPLAINT ON ADMISSION:  Chest pain and back pain. BRIEF HISTORY OF PRESENT ILLNESS:  A 74-year-old who has dwindling for  three to four weeks. He had hemoptysis as he has been on aspirin,  Plavix, and Pletal.  He is continuing to smoke. Presented to the  hospital _____ bilateral pleural effusions. PHYSICAL EXAMINATION:  VITAL SIGNS:  Showed blood pressure 118/53. HEENT:  His mouth was erythematous. NECK:  Positive bruits. LUNGS:  Had bilateral wheeze. No rhonchi. Seen by Pulmonary and Cardiology. Cardiology basically signed off. Pulmonary saw him and placed him on oxygen. He had thoracentesis on  both sides to get relief. He did not get much relief. We found he had  a mass in the right mid lung field. 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 FINAL DIAGNOSIS:  Acute respiratory failure due to pleural effusions and  ultimately lung carcinoma of a mixed nature, and he was sent into  subacute rehab for further diagnosis.         Angie Loera MD    D: 08/15/2019 8:53:47       T: 08/15/2019 10:22:09     [email or web address]  Job#: [id number]     Doc#: [id number]    CC:

## 2019-09-06 PROBLEM — M84.48XA: Status: ACTIVE | Noted: 2019-01-01

## 2019-09-06 NOTE — ED NOTES
ERLINDA faxed and received per floor, patient chimed for transport upstairs     Jules Mayer RN  09/06/19 1089

## 2019-09-06 NOTE — PROGRESS NOTES
Database complete. Medications reconciled. Care plans and education initiated. Wears 2L 02 continuous at home. Cardiologist was Dr. Azucena Martines. Oncologist is Dr. Jacquie Lo. Pulmonologist is Dr. Genet Harris.

## 2019-09-06 NOTE — ED PROVIDER NOTES
handling secretions, no trismus  Neck: Supple, tenderness to the upper midline and paraspinal cervical region  Pulmonary: Lungs clear to auscultation bilaterally, no wheezes, rales, or rhonchi. Not in respiratory distress  Cardiovascular:  Regular rate and rhythm, no murmurs, gallops, or rubs. 2+ distal pulses  Abdomen: Soft, non tender, non distended,   Extremities: Moves all extremities x 4. Warm and well perfused  Skin: warm and dry without rash  Neurologic: GCS 15,  Psych: Normal Affect      ------------------------------ ED COURSE/MEDICAL DECISION MAKING----------------------  Medications   HYDROmorphone (DILAUDID) injection 1 mg (1 mg Intravenous Given 19 1526)   HYDROmorphone (DILAUDID) injection 1 mg (1 mg Intravenous Given 19 1628)           Medical Decision Makin-year-old male with history of lung cancer with bony metastasis presenting with new neck pain. Concern for osseous lesion. He is hemodynamically stable and well-appearing. He is neurologically intact with no weakness or neurologic deficit to his upper extremities. CT imaging of the cervical spine shows lytic lesions and new pathologic fractures C3, C4, C5. Patient was placed in a cervical collar upon results. Discussion with patient and family, uncomfortable with discharge home as patient will have difficulty in a cervical collar, has little to no help with his wife who is also somewhat debilitated. Patient's PCP was contacted for admission, agreed to admit the patient for further management. Patient was given 2 doses of Dilaudid with improvement in pain. He is resting comfortably during the remainder of his ED course. Counseling: The emergency provider has spoken with the patient and spouse/SO and discussed todays results, in addition to providing specific details for the plan of care and counseling regarding the diagnosis and prognosis.   Questions are answered at this time and they are agreeable with the

## 2019-09-07 PROBLEM — E44.0 MODERATE PROTEIN-CALORIE MALNUTRITION (HCC): Status: ACTIVE | Noted: 2019-01-01

## 2019-09-07 NOTE — H&P
History & Physical        Reason for admission:    History Obtained From:  patient    HISTORY OF PRESENT ILLNESS:    The patient is a 70 y.o. male who presents to the emergency room with complaint of severe neck pain, evaluation in emergency room indicated lytic lesions in the cervical spine, patient has known history of lung cancer with mets he is known to the [facility].     Past Medical History:        Diagnosis Date    Aneurysm of abdominal aorta ([address] 9/14/2015    Arrhythmia     Bilateral carotid artery stenosis 9/23/2015    CAD (coronary artery disease)     Cancer (HCC)     lip    Carotid bruit 9/3/2015    CHF (congestive heart failure) ([address]     pt unaware of DX, has spoken with PCP    COPD (chronic obstructive pulmonary disease) ([address]     Depression     Femoral artery aneurysm, left ([address] 2/18/2016    Femoral artery pseudo-aneurysm, left ([address] 11/2/2017    History of angioplasty of peripheral vessel 9/8/2016    Hyperlipidemia     Hypertension     Hypothyroidism     Left carotid stenosis 9/3/2015    Lung cancer ([address]     Lymphedema of lower extremity 9/3/2015    Onychomycosis 9/3/2015    PVD (peripheral vascular disease) with claudication ([address] 9/3/2015    S/P carotid endarterectomy 2/18/2016    Spinal stenosis     [facility]I [facility] infarction) [facility]     Swelling of lower limb 9/3/2015    Thyroid disease        Past Surgical History:        Procedure Laterality Date    APPENDECTOMY      CARDIAC SURGERY  2000    bypass dr Brigette Caraballo Right 11-2-15    Dr. Umer Andrade Left 10/31/2016    Dr. Brian naranjo    FEMORAL BYPASS  2007    LAMINECTOMY  2004    OTHER SURGICAL HISTORY  1/8/16    Athrectomy/angioplasty R SFA/Popliteal    SKIN CANCER EXCISION  2011    lip-Dr Rock Reyes THORACENTESIS  08/06/2019    TONSILLECTOMY         Medications Prior to epistaxis  MOUTH/THROAT:  Neg  bleeding gums,hoarseness or sore throat. RESPIRATORY:   Cough and phlegm intermittently. CARDIOVASCULAR   Neg : chest pain,palpitations,dyspnea on exertion,orthopnea,paroxysmal nocturnal dyspnea or edema  GASTROINTESTINAL: Poor appetite no pain or nausea or vomiting no diarrhea. NEUROLOGICAL:  Neg  Loss of Consciousness,memeory loss,forgetfulness,periods of confusion,decline in intellect,nervousness,insomina,aphasia or dysarthria. SKIN :  Neg  skin or hair changes,and has no itching,rashes,sores. PHYSICAL EXAM:  BP (!) 130/58   Pulse 59   Temp 97.5 °F (36.4 °C) (Oral)   Resp 16   Ht 5' 6\" (1.676 m)   Wt 160 lb (72.6 kg)   SpO2 99%   BMI 25.82 kg/m²   General appearance: alert, appears stated age, cooperative, hard neck collar is in place. Head: Normocephalic, without obvious abnormality, atraumatic  Eyes: conjunctivae/corneas clear. PERRL, EOM's intact. Ears: normal external ear canals both ears  Neck: Hard neck collar in place. Lungs: Scattered rhonchi anteriorly bilaterally, no wheezing. Heart: regular rate and rhythm, S1, S2 normal, no murmur, regular rate and rhythm ,no precordial heave  Abdomen:soft, non-tender; non-distended normal bowel sounds no masses, no organomegaly  Extremities:Pedal edema none  Homans sign is negative, no sign of DVT  Skin: Skin color, texture, turgor normal. No rashes or lesions  Neurologic:Mental status: Alert, oriented, thought content appropriate  Cranial nerves:II-XII Grossly intact  Sensory: normal  Motor:grossly normal        RADIOLOGY:   CT Cervical Spine WO Contrast   Final Result   Diffuse multiple lytic lesion throughout the cervical spine concerning   for widespread metastatic malignancy with  pathologic fractures of C3,   C4 and C5. Consider better assessment by MRI. The above findings were telephoned to the ED physician.       ALERT:  THIS IS AN ABNORMAL REPORT                  MRI CERVICAL SPINE W WO CONTRAST

## 2019-09-07 NOTE — PLAN OF CARE
Problem: Falls - Risk of:  Goal: Will remain free from falls  Description  Will remain free from falls  9/7/2019 0337 by Clau Capps RN  Outcome: Met This Shift  9/6/2019 1844 by Dorothea Sandoval RN  Outcome: Met This Shift  Goal: Absence of physical injury  Description  Absence of physical injury  9/6/2019 1844 by Dorothea Sandoval RN  Outcome: Met This Shift     Problem: Risk for Impaired Skin Integrity  Goal: Tissue integrity - skin and mucous membranes  Description  Structural intactness and normal physiological function of skin and  mucous membranes.   9/7/2019 0337 by Clau Capps RN  Outcome: Met This Shift  9/6/2019 1844 by Dorothea Sandoval RN  Outcome: Met This Shift

## 2019-09-07 NOTE — PROGRESS NOTES
[facility] Quality Flow/Interdisciplinary Rounds Progress Note        Quality Flow Rounds held on September 7, 2019    Disciplines Attending:  Bedside Nurse, ,  and Nursing Unit Leadership    Joon Sen was admitted on 9/6/2019  1:52 PM    Anticipated Discharge Date:  Expected Discharge Date: 09/09/19    Disposition:    Yang Score:  Yagn Scale Score: 17    Readmission Risk              Risk of Unplanned Readmission:        12           Discussed patient goal for the day, patient clinical progression, and barriers to discharge.   The following Goal(s) of the Day/Commitment(s) have been identified:  Pain Control      Madiha Organ  September 7, 2019

## 2019-09-08 NOTE — PROGRESS NOTES
[facility] Quality Flow/Interdisciplinary Rounds Progress Note        Quality Flow Rounds held on September 8, 2019    Disciplines Attending:  Bedside Nurse, ,  and Nursing Unit Leadership    Ambar Harman was admitted on 9/6/2019  1:52 PM    Anticipated Discharge Date:  Expected Discharge Date: 09/09/19    Disposition:    Yang Score:  Yang Scale Score: 13    Readmission Risk              Risk of Unplanned Readmission:        16           Discussed patient goal for the day, patient clinical progression, and barriers to discharge.   The following Goal(s) of the Day/Commitment(s) have been identified:  Pain Control      Ysabel Rising  September 8, 2019

## 2019-09-08 NOTE — CONSULTS
vessel 2016    Hyperlipidemia     Hypertension     Hypothyroidism     Left carotid stenosis 9/3/2015    [facility]     Lymphedema of lower extremity 9/3/2015    Onychomycosis 9/3/2015    PVD (peripheral vascular disease) with claudication ([facility] 9/3/2015    S/P carotid endarterectomy 2016    Spinal stenosis     [facility]     Swelling of lower limb 9/3/2015    Thyroid disease      Past Surgical History:   Procedure Laterality Date    APPENDECTOMY      CARDIAC SURGERY  2000    bypass dr Lyn Welsh Right 11-2-15    Dr. Marybeth Beal Left 10/31/2016    Dr. Suzan Bray lensjoya implants   [address]  2007    LAMINECTOMY  2004    OTHER SURGICAL HISTORY  16    Athrectomy/angioplasty R SFA/Popliteal    SKIN CANCER EXCISION      lip-Dr Claire Munroe THORACENTESIS  2019    TONSILLECTOMY        Family History   Problem Relation Age of Onset    Heart Disease Father       Social History     Socioeconomic History    Marital status:      Spouse name: Not on file    Number of children: Not on file    Years of education: Not on file    Highest education level: Not on file   Occupational History    Not on file   Social Needs    Financial resource strain: Not on file    Food insecurity:     Worry: Not on file     Inability: Not on file    Transportation needs:     Medical: Not on file     Non-medical: Not on file   Tobacco Use    Smoking status: Former Smoker     Packs/day: 0.50     Years: 30.00     Pack years: 15.00     Types: Cigars, Cigarettes     Start date: 1990     Last attempt to quit: 2019     Years since quittin.1    Smokeless tobacco: Never Used    Tobacco comment: Smokes 1 cigar a day 8/15/19; quit cigarettes 33 yrs ago   Substance and Sexual Activity    Alcohol use: Not Currently     Comment:

## 2019-09-08 NOTE — PROGRESS NOTES
encounter  Resolved Problems:    * No resolved hospital problems. *    28-year-old gentleman with a new diagnosis of metastatic lung cancer, PD-L1 high expression was to start Keytruda last week but was having severe pain. He was instructed to go the ER for further evaluation and management. He was seen by radiation oncology as well as the neurosurgeon. Currently on high-dose steroid and a cervical collar. Recommendation is to proceed with radiation for palliation of his pain. Currently he has some reservations mostly financial    Plan:  We discussed his concerns.  will hopefully be able to address these better as far as counseling him on the finances especially for proceeding into a nursing home. He was encouraged to consider doing a radiation for palliation of his pain. Afterwards, we discussed the rationale for the Keytruda to address his systemic disease. Currently he is having severe pain. Percocet has not been alleviating the pain at all. Continue with Decadron to alleviate some of the swelling and pain. Continue Lovenox DVT prophylaxis. Start him on a long-acting pain medication.       Electronically signed by Alondra Valentin MD on 9/8/2019 at 10:31 AM

## 2019-09-09 NOTE — PROGRESS NOTES
9/9/2019  1:30 PM      Nutrition Assessment    Type and Reason for Visit: Consult (per Wound Care RN)    Nutrition Recommendations: Continue current diet and ONS, as tolerated    Nutrition Assessment: Pt meets criteria for moderate malnutrition AEB <75% needs met>1 month, muscle loss and 18% wt loss in last 9 months. Pt recently dx lung CA w/mets to spine, which led to anorexia and pain. Will initiate ONS with all meals and monitor tolerance. Malnutrition Assessment:  · Malnutrition Status: Meets the criteria for moderate malnutrition  · Context: Chronic illness  · Findings of the 6 clinical characteristics of malnutrition (Minimum of 2 out of 6 clinical characteristics is required to make the diagnosis of moderate or severe Protein Calorie Malnutrition based on AND/ASPEN Guidelines):  1. Energy Intake-Less than or equal to 75% of estimated energy requirement, Greater than or equal to 1 month    2. Weight Loss-10% loss or greater, in 6 months  3. Fat Loss-No significant subcutaneous fat loss,    4. Muscle Loss-Mild muscle mass loss, Temples (temporalis muscle)  5. Fluid Accumulation-No significant fluid accumulation, Extremities(edema multifactorial)  6.  Strength-Not measured(Pt w/Cervical fx)    Nutrition Risk Level: Moderate    Nutrient Needs:  · Estimated Daily Total Kcal:  (MSJ x 1.35)  · Estimated Daily Protein (g):  (1.4-1.6 g/kg IBW)  · Estimated Daily Total Fluid (ml/day):  (1 ml/suzanna)    Nutrition Diagnosis:   · Problem:  Moderate malnutrition, In context of chronic illness  · Etiology: related to Catabolic illness(anorexia and pain resulting from CA)     Signs and symptoms:  as evidenced by Intake 0-25%, Diet history of poor intake, Weight loss greater than or equal to 10% in 6 months, Mild muscle loss, Presence of wounds    Objective Information:  · Nutrition-Focused Physical Findings: poor appetite and severe head/neck pain PTA, A&O x 4, soft abdomen with +BS, +1 edema  · Wound

## 2019-09-09 NOTE — PROGRESS NOTES
Department of Internal Jcarlos Goss M.D.  Progress Note      SUBJECTIVE:  Discussed the situation and he needs to talk with hospice about financial consideration for future;    OBJECTIVE  abd soft    Medications    Current Facility-Administered Medications: oxyCODONE (OXYCONTIN) extended release tablet 10 mg, 10 mg, Oral, 2 times per day  ipratropium-albuterol (DUONEB) nebulizer solution 1 ampule, 1 ampule, Inhalation, Q4H WA  dexamethasone (DECADRON) injection 4 mg, 4 mg, Intravenous, Q6H  pantoprazole (PROTONIX) tablet 40 mg, 40 mg, Oral, QAM AC  polyethylene glycol (GLYCOLAX) packet 17 g, 17 g, Oral, Daily  atorvastatin (LIPITOR) tablet 80 mg, 80 mg, Oral, Nightly  baclofen (LIORESAL) tablet 10 mg, 10 mg, Oral, BID  carvedilol (COREG) tablet 12.5 mg, 12.5 mg, Oral, BID  vitamin B-12 (CYANOCOBALAMIN) tablet 500 mcg, 500 mcg, Oral, Daily  escitalopram (LEXAPRO) tablet 20 mg, 20 mg, Oral, Daily  levothyroxine (SYNTHROID) tablet 50 mcg, 50 mcg, Oral, Daily  lisinopril (PRINIVIL;ZESTRIL) tablet 10 mg, 10 mg, Oral, Daily  loperamide (IMODIUM) capsule 2 mg, 2 mg, Oral, 4x Daily PRN  ALPRAZolam (XANAX) tablet 0.5 mg, 0.5 mg, Oral, TID PRN  diphenhydrAMINE (BENADRYL) tablet 50 mg, 50 mg, Oral, Nightly  gabapentin (NEURONTIN) capsule 600 mg, 600 mg, Oral, TID  sodium chloride flush 0.9 % injection 10 mL, 10 mL, Intravenous, 2 times per day  sodium chloride flush 0.9 % injection 10 mL, 10 mL, Intravenous, PRN  acetaminophen (TYLENOL) tablet 650 mg, 650 mg, Oral, Q4H PRN  enoxaparin (LOVENOX) injection 40 mg, 40 mg, Subcutaneous, Daily  HYDROmorphone (DILAUDID) injection 0.5 mg, 0.5 mg, Intravenous, Q4H PRN  oxyCODONE-acetaminophen (PERCOCET) 5-325 MG per tablet 1 tablet, 1 tablet, Oral, Q4H PRN  ondansetron (ZOFRAN) injection 4 mg, 4 mg, Intravenous, Q6H PRN  Physical    VITALS:  BP (!) 122/49   Pulse 74   Temp 98.4 °F (36.9 °C) (Oral)   Resp 14   Ht 5' 6\" (1.676 m)   Wt 160 lb (72.6 kg)   SpO2 96%   BMI 25.82 kg/m²   24HR INTAKE/OUTPUT:      Intake/Output Summary (Last 24 hours) at 9/9/2019 0834  Last data filed at 9/9/2019 0520  Gross per 24 hour   Intake 120 ml   Output 250 ml   Net -130 ml     LUNGS:  Dec breath sounds   CARDIOVASCULAR:  Normal apical impulse, regular rate and rhythm, normal S1 and S2, no S3 or S4, and no murmur noted  Data    CBC with Differential:    Lab Results   Component Value Date    WBC 11.7 09/07/2019    RBC 3.04 09/07/2019    HGB 9.9 09/07/2019    HCT 31.4 09/07/2019     09/07/2019    .3 09/07/2019    MCH 32.6 09/07/2019    MCHC 31.5 09/07/2019    RDW 13.7 09/07/2019    SEGSPCT 58 03/29/2012    LYMPHOPCT 6.5 09/07/2019    MONOPCT 5.6 09/07/2019    BASOPCT 0.3 09/07/2019    MONOSABS 0.65 09/07/2019    LYMPHSABS 0.76 09/07/2019    EOSABS 0.85 09/07/2019    BASOSABS 0.04 09/07/2019     CMP:    Lab Results   Component Value Date     09/07/2019    K 4.2 09/07/2019    K 3.7 08/05/2019    CL 99 09/07/2019    CO2 33 09/07/2019    BUN 18 09/07/2019    CREATININE 0.6 09/07/2019    CREATININE 0.6 09/07/2019    GFRAA >60 09/07/2019    GFRAA >60 09/07/2019    LABGLOM >60 09/07/2019    LABGLOM >60 09/07/2019    GLUCOSE 123 09/07/2019    GLUCOSE 81 03/29/2012    PROT 7.1 09/07/2019    LABALBU 2.8 09/07/2019    CALCIUM 8.7 09/07/2019    BILITOT 1.2 09/07/2019    ALKPHOS 387 09/07/2019    AST 29 09/07/2019    ALT 29 09/07/2019     PT/INR:    Lab Results   Component Value Date    PROTIME 14.2 08/04/2019    PROTIME 12.3 03/29/2012    INR 1.3 08/04/2019       ASSESSMENT AND PLAN      Active Problems:        Pathologic cervical vertebral fracture, initial encounter  Plan: palliative radiation to spine  Adeno squamous metastatic lung cancer   To liver and both lungs and multiple vertebra         Electronically signed by Geovanna Pittman MD on 9/9/2019 at 8:34 AM

## 2019-09-09 NOTE — PROGRESS NOTES
P Quality Flow/Interdisciplinary Rounds Progress Note        Quality Flow Rounds held on September 9, 2019    Disciplines Attending:  Bedside Nurse, ,  and Nursing Unit Leadership    Brian Ramirez was admitted on 9/6/2019  1:52 PM    Anticipated Discharge Date:  Expected Discharge Date: 09/09/19    Disposition:    Yang Score:  Yang Scale Score: 16    Readmission Risk              Risk of Unplanned Readmission:        17           Discussed patient goal for the day, patient clinical progression, and barriers to discharge.   The following Goal(s) of the Day/Commitment(s) have been identified:  Discharge Planning      [facility]  September 9, 2019

## 2019-09-09 NOTE — FLOWSHEET NOTE
Inpatient Wound Care    Admit Date: 9/6/2019  1:52 PM    Reason for consult: Buttocks    Significant history:  Admitted with Pathologic cervical vertebral fracture. History includes: lung cancer with mets to bone, CAD, PVD and HTN. Wound history:  POA    Findings:       09/09/19 1308   Wound 09/06/19 Coccyx   Date First Assessed/Time First Assessed: 09/06/19 1914   Present on Hospital Admission: Yes  Location: Coccyx   Wound Image    Wound Pressure Stage  3   Dressing Status Changed   Dressing Changed Changed/New   Dressing/Treatment Alginate   Wound Cleansed Rinsed/Irrigated with saline   Dressing Change Due 09/11/19   Wound Length (cm) 0.3 cm   Wound Width (cm) 0.3 cm   Wound Depth (cm) 0.5 cm   Wound Surface Area (cm^2) 0.09 cm^2   Change in Wound Size % (l*w) 64   Wound Volume (cm^3) 0.04 cm^3   Wound Assessment Pink   Drainage Amount Moderate   Drainage Description Yellow   Odor Strong       Impression:  Stage 3 pressure injury on coccyx. Heels intact. Interventions in place:  Cervical collar is intact. Pt has severe pain in his back and chest. Coccyx cleansed with NSS. [employer] then covered with mepilex. Dolphin mattress ordered for pressure re-distribution and comfort. Plan: QOD dressing changes to coccyx, SOS precautions, Dolphin mattress.      Barb Rodriguez 9/9/2019 1:11 PM

## 2019-09-09 NOTE — PROGRESS NOTES
effect on the ventral surface of   the cord. Severe stenosis of bilateral neuroforamen due to   uncovertebral hypertrophy and degenerative facet arthropathy.       C6-7: No central canal stenosis. Mild stenosis of bilateral neural   foramen due to uncovertebral hypertrophy and degenerative facet   arthropathy.       C7-T1: Left paracentral posterior disc and osteophyte complex these   are slightly mass effect on the ventral surface of the cord without   alisa central canal stenosis. No foraminal stenosis.         Impression       Metastatic lesions are confirmed within the vertebral bodies of C3,   C7, T2 and T3. Minimal enhancement.       Central spinal canal stenosis present at C2-3 and C5-6.       Neural foraminal canal stenosis is present on the right at C2-3,   bilaterally at C4-5, C5-6 and C6-7.       Posterior disc and osteophyte complex exert mass effect on the ventral   surface of the cord at C5-6 and C7-T1. Allergies:  Patient has no known allergies. Family Goal: undecided    Meeting held with Patient, wife, Danny Brice, and daughter, Tamara Libman. Medical record reviewed. Blu Buchanan came into hospital on 9/6/19 for complaints of neck pain. He was recently sent home from [employer] [facility]. He was there for skilled rehab. Danny Brice said Blu Buchanan came home so he could have a PET scan. He had a thoracentesis done on 8/6/19. Hospice philosophy discussed. In patients with a life-limiting illness the focus of care is no longer aggressive treatment, but comfort and symptom management. I discussed the hospice team consists of physician, nurse, personal care team, , LSW and non-medical volunteers. In routine level of hospice care the nurse and aide visit 1-3 times a week. The LSW visits within five days of electing hospice care. The hospice benefit will provide any needed equipment, supplies and medication related to his life-limiting illness.  If hospice care is provided in a nursing home the room and board fees

## 2019-09-10 NOTE — DISCHARGE INSTR - COC
([facility] J44.9    Hypothyroidism E03.9    Spinal stenosis M48.00    CHF (congestive heart failure) ([facility]) I50.9    Presence of drug coated stent in left circumflex coronary artery Z95.5    Obesity (BMI 30.0-34. 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    Pneumonia J18.9    Chest pain R07.9    Acute diastolic CHF (congestive heart failure) ([facility]) I50.31    Edema R60.9    LV dysfunction I51.9    Moderate protein-calorie malnutrition ([facility]) E44.0    Pathologic cervical vertebral fracture, initial encounter M84.48XA       Isolation/Infection:   Isolation          No Isolation            Nurse Assessment:  Last Vital Signs: BP (!) 123/58   Pulse 63   Temp 97.8 °F (36.6 °C) (Oral)   Resp 14   Ht 5' 6\" (1.676 m)   Wt 160 lb (72.6 kg)   SpO2 93%   BMI 25.82 kg/m²     Last documented pain score (0-10 scale): Pain Level: 7  Last Weight:   Wt Readings from Last 1 Encounters:   09/07/19 160 lb (72.6 kg)     Mental Status:  oriented, alert and coherent    IV Access:  - None    Nursing Mobility/ADLs:  Walking   Assisted  Transfer  Assisted  Bathing  Assisted  Dressing  Assisted  Toileting  Assisted  Feeding  Assisted  Med Admin  Assisted  Med Delivery   whole    Wound Care Documentation and Therapy:  Wound 09/06/19 Coccyx (Active)   Wound Image   9/9/2019  1:08 PM   Wound Pressure Stage  3 9/9/2019  1:08 PM   Dressing Status Changed 9/9/2019 11:30 PM   Dressing Changed Changed/New 9/9/2019 11:30 PM   Dressing/Treatment Alginate 9/9/2019  1:08 PM   Wound Cleansed Status Date: ***    Readmission Risk Assessment Score:  Readmission Risk              Risk of Unplanned Readmission:        19           Discharging to Facility/ Agency   · Name: [facility]   · Address:  · Phone: [phone]  · Fax: [phone]    Dialysis Facility (if applicable)   · Name:  · Address:  · Dialysis Schedule:  · Phone:  · Fax:    / signature: Electronically signed by CARLINE Patel on 9/10/2019 at 2:46 PM    PHYSICIAN SECTION    Prognosis: {Prognosis:9366643857}    Condition at Discharge: [address] Patient Condition:[id number]}    Rehab Potential (if transferring to Rehab): {Prognosis:4179892137}    Recommended Labs or Other Treatments After Discharge: ***    Physician Certification: I certify the above information and transfer of Marla Saxena  is necessary for the continuing treatment of the diagnosis listed and that he requires [place] for less 30 days.      Update Admission H&P: {CHP DME Changes in UCJCP:995412254}    PHYSICIAN SIGNATURE:  {Esignature:[id number]} Electronically signed by Fredy Briseno MD on 9/11/2019 at 3:58 PM

## 2019-09-10 NOTE — PLAN OF CARE
Problem: Falls - Risk of:  Goal: Will remain free from falls  Description  Will remain free from falls  9/10/2019 1023 by Berkley Chin RN  Outcome: Met This Shift  9/9/2019 2026 by Tyson Moore RN  Outcome: Met This Shift     Problem: Risk for Impaired Skin Integrity  Goal: Tissue integrity - skin and mucous membranes  Description  Structural intactness and normal physiological function of skin and  mucous membranes.   9/10/2019 1023 by Berkley Chin RN  Outcome: Met This Shift  9/9/2019 2026 by Tyson Moore RN  Outcome: Met This Shift     Problem: Pain:  Goal: Pain level will decrease  Description  Pain level will decrease     Pain level will decrease  9/9/2019 2026 by Tyson Moore RN  Outcome: Met This Shift

## 2019-09-10 NOTE — PROGRESS NOTES
Physical Therapy  PT orders received. Pt DNR -CC and being discharged to [facility] with Hospice care.  Will hold on PT eval.

## 2019-09-10 NOTE — PROGRESS NOTES
Spoke with Sarah Saldaña. Plan is to discharge to [facility] with [facility]. Per Renetta Paris, Liaison from [facility] only needed equipment would be low air loss mattress. Will fax request when I am sure about discharge and have room at [facility]. Stopped in room, Camilo Haroldo not here, Cassidy De is asleep, respirations unlabored. Asked Unit Governor Cornea to contact me if Camilo Zarco comes back before 1700.

## 2019-09-10 NOTE — CARE COORDINATION
Social Work:    Reviewed hospice note, confirmed [facility] denial, and met with Mr. AlanizOrenrodney Alarcon confirmed choice of [facility] with AYEV. Hilary Lo at [facility] accepted. Charge RN is aware that patient is able to transfer when stable.     Electronically signed by CARLINE Paulson on 9/10/2019 at 2:24 PM

## 2019-09-10 NOTE — PROGRESS NOTES
Stopped back in room, wife present. Discussed plans to discharge to [facility] with hospice care. Parish Galvan spoke about filling out forms for Medicaid, also about her mother being at [facility] when she . Emotional support and active listening as Parish Galvan discussed Radha Macias. She said she is still in disbelief, but wants him comfortable.

## 2019-09-11 PROBLEM — Z87.81 H/O CERVICAL FRACTURE: Status: ACTIVE | Noted: 2019-01-01

## 2019-09-11 NOTE — PROGRESS NOTES
Faxed face sheet and DME form to [employer]. Spoke with Cheryle Khalil. Equipment will be delivered out today. Visit made to unit. Spoke with Buck Ding. Ambulance will transport patient to [facility] at 2pm. Dr. Katy Lopez MD will be the following physician at the facility. Received terminal diagnosis of Metastatic Adeno Squamo cancer of lung from Dr. Katy Lopez. Visit made to room. No family present. Patient is awake, sitting up in bed eating breakfast. Patient stated his wife will not be coming into the hospital today, she will meet him at the facility later today. Call placed to patient's wife Jodi Grubbs and gave update on time of discharge. Jodi Grubbs will meet with HOT nurse at the facility to sign consents. Call placed to [facility], Nurse liaison from [facility] and provided update on DME and transportation. Call placed to [facility]. Spoke with Elvira Duke on nurses unit. Asked for a hospice order be placed on chart with a terminal diagnosis of Adeno-Squamo cancer of the Lung. Verified that Dr. Katy Lopez will follow at the facility. Call placed to [facility] intake department and spoke with Diana Sainz RN. Provided information and consents will need to be signed.

## 2019-09-11 NOTE — PROGRESS NOTES
Intake/Output Summary (Last 24 hours) at 9/11/2019 0812  Last data filed at 9/11/2019 0540  Gross per 24 hour   Intake 490 ml   Output 625 ml   Net -135 ml     LUNGS:  Dec breath sounds  CARDIOVASCULAR:  Normal apical impulse, regular rate and rhythm, normal S1 and S2, no S3 or S4, and no murmur noted  Data    CBC with Differential:    Lab Results   Component Value Date    WBC 16.6 09/11/2019    RBC 3.16 09/11/2019    HGB 10.3 09/11/2019    HCT 32.8 09/11/2019     09/11/2019    .8 09/11/2019    MCH 32.6 09/11/2019    MCHC 31.4 09/11/2019    RDW 14.5 09/11/2019    SEGSPCT 58 03/29/2012    LYMPHOPCT 6.5 09/07/2019    MONOPCT 5.6 09/07/2019    BASOPCT 0.3 09/07/2019    MONOSABS 0.65 09/07/2019    LYMPHSABS 0.76 09/07/2019    EOSABS 0.85 09/07/2019    BASOSABS 0.04 09/07/2019     CMP:    Lab Results   Component Value Date     09/11/2019    K 4.8 09/11/2019    K 3.7 08/05/2019    CL 96 09/11/2019    CO2 33 09/11/2019    BUN 21 09/11/2019    CREATININE 0.7 09/11/2019    GFRAA >60 09/11/2019    LABGLOM >60 09/11/2019    GLUCOSE 127 09/11/2019    GLUCOSE 81 03/29/2012    PROT 7.1 09/07/2019    LABALBU 2.8 09/07/2019    CALCIUM 8.6 09/11/2019    BILITOT 1.2 09/07/2019    ALKPHOS 387 09/07/2019    AST 29 09/07/2019    ALT 29 09/07/2019     PT/INR:    Lab Results   Component Value Date    PROTIME 14.2 08/04/2019    PROTIME 12.3 03/29/2012    INR 1.3 08/04/2019       ASSESSMENT AND PLAN      Active Problems:     Moderate protein-calorie malnutrition ([address]  Plan: comfort    Pathologic cervical vertebral fracture, initial encounter  Plan: cervical br4ace    Metastatic adeno -squamo cancer lung origin  Hospice care      Electronically signed by Connie Flores MD on 9/11/2019 at 8:12 AM

## 2019-09-11 NOTE — CARE COORDINATION
Social Work:    [facility] to transfer Mr. Pascal to [place] [address] today with [facility]. Social work notified [facility] and [facility] wife, [facility] [place] and Sana Prado at [place] [address].     Electronically signed by CARLINE Carson on 9/11/2019 at 12:35 PM

## 2019-09-11 NOTE — PROGRESS NOTES
Nutrition Assessment    Type and Reason for Visit: Reassess    Nutrition Recommendations: Recommend to continue Ensure Enlive supplement BID and Ensure Clear supplement once daily both to help meet increased nutritional needs. Nutrition Assessment: Hospice consulted ; Patient remains moderately malnourished ; Pt at further nutritional risk AEB decreased po intake of meals and increased needs from catabolic illness ; Will continue nutritional supplementation     Malnutrition Assessment:  · Malnutrition Status: Meets the criteria for moderate malnutrition  · Context: Chronic illness  · Findings of the 6 clinical characteristics of malnutrition (Minimum of 2 out of 6 clinical characteristics is required to make the diagnosis of moderate or severe Protein Calorie Malnutrition based on AND/ASPEN Guidelines):  1. Energy Intake-Less than or equal to 75% of estimated energy requirement, Greater than or equal to 1 month    2. Weight Loss-10% loss or greater, in 6 months  3. Fat Loss-No significant subcutaneous fat loss,    4. Muscle Loss-Mild muscle mass loss, Temples (temporalis muscle)  5. Fluid Accumulation-No significant fluid accumulation, Extremities(edema multifactorial)  6.  Strength-Not measured    Nutrition Risk Level: Moderate    Nutrient Needs:  · Estimated Daily Total Kcal: 1186-3416 (REE 1395 x 1.3 SF)  · Estimated Daily Protein (g):  (1.3-1.5g/kg IBW)  · Estimated Daily Total Fluid (ml/day): 6533-2995    Nutrition Diagnosis:   · Problem:  Moderate malnutrition, In context of chronic illness  · Etiology: related to Catabolic illness     Signs and symptoms:  as evidenced by Diet history of poor intake, Mild muscle loss, Weight loss, Presence of wounds    Objective Information:  · Nutrition-Focused Physical Findings: I&Os WNL ; trace edema ; active BS ; A&O x 4 ; U/L dentures ; pale/dry/dusky skin ; redness to heels ; poor appetite      · Wound Type: Stage III, Open Wounds(Stage III wound noted

## 2019-09-20 NOTE — DISCHARGE SUMMARY
CONTINUE these medications which have NOT CHANGED    Details   ipratropium-albuterol (DUONEB) 0.5-2.5 (3) MG/3ML SOLN nebulizer solution Inhale 3 mLs into the lungs every 4 hours (while awake), Disp-360 mLDC to SNF      baclofen (LIORESAL) 10 MG tablet Take 10 mg by mouth 2 times daily , R-0Historical Med      escitalopram (LEXAPRO) 20 MG tablet Take 20 mg by mouth daily , R-1Historical Med      loperamide (IMODIUM) 2 MG capsule Take 2 mg by mouth 4 times daily as needed for Diarrhea      diphenhydrAMINE (BENADRYL) 25 MG capsule Take 50 mg by mouth nightly Historical Med      lisinopril (PRINIVIL;ZESTRIL) 10 MG tablet Take 1 tablet by mouth daily. , Disp-30 tablet, R-11      carvedilol (COREG) 12.5 MG tablet Take 1 tablet by mouth 2 times daily. , Disp-60 tablet, R-11      gabapentin (NEURONTIN) 600 MG tablet Take 600 mg by mouth 3 times daily. levothyroxine (SYNTHROID) 50 MCG tablet Take 50 mcg by mouth daily.            STOP taking these medications       HYDROcodone-acetaminophen (NORCO) 5-325 MG per tablet Comments:   Reason for Stopping:         bumetanide (BUMEX) 0.5 MG tablet Comments:   Reason for Stopping:         Cyanocobalamin (VITAMIN B 12 PO) Comments:   Reason for Stopping:         cilostazol (PLETAL) 100 MG tablet Comments:   Reason for Stopping:         cholestyramine (QUESTRAN) 4 GM/DOSE powder Comments:   Reason for Stopping:         clopidogrel (PLAVIX) 75 MG tablet Comments:   Reason for Stopping:         atorvastatin (LIPITOR) 80 MG tablet Comments:   Reason for Stopping:         aspirin 325 MG tablet Comments:   Reason for Stopping:                   Signed:  Electronically signed by Leydi Issa MD on 9/20/2019 at 9:03 AM